# Patient Record
Sex: FEMALE | Race: WHITE | NOT HISPANIC OR LATINO
[De-identification: names, ages, dates, MRNs, and addresses within clinical notes are randomized per-mention and may not be internally consistent; named-entity substitution may affect disease eponyms.]

---

## 2017-12-08 ENCOUNTER — RESULT REVIEW (OUTPATIENT)
Age: 72
End: 2017-12-08

## 2017-12-11 PROBLEM — Z00.00 ENCOUNTER FOR PREVENTIVE HEALTH EXAMINATION: Status: ACTIVE | Noted: 2017-12-11

## 2018-01-05 ENCOUNTER — APPOINTMENT (OUTPATIENT)
Dept: OTOLARYNGOLOGY | Facility: CLINIC | Age: 73
End: 2018-01-05
Payer: MEDICARE

## 2018-01-05 VITALS
HEIGHT: 66 IN | WEIGHT: 210 LBS | HEART RATE: 88 BPM | DIASTOLIC BLOOD PRESSURE: 84 MMHG | BODY MASS INDEX: 33.75 KG/M2 | SYSTOLIC BLOOD PRESSURE: 145 MMHG

## 2018-01-05 DIAGNOSIS — F17.200 NICOTINE DEPENDENCE, UNSPECIFIED, UNCOMPLICATED: ICD-10-CM

## 2018-01-05 DIAGNOSIS — Z82.49 FAMILY HISTORY OF ISCHEMIC HEART DISEASE AND OTHER DISEASES OF THE CIRCULATORY SYSTEM: ICD-10-CM

## 2018-01-05 DIAGNOSIS — Z80.1 FAMILY HISTORY OF MALIGNANT NEOPLASM OF TRACHEA, BRONCHUS AND LUNG: ICD-10-CM

## 2018-01-05 DIAGNOSIS — Z78.9 OTHER SPECIFIED HEALTH STATUS: ICD-10-CM

## 2018-01-05 DIAGNOSIS — Z83.3 FAMILY HISTORY OF DIABETES MELLITUS: ICD-10-CM

## 2018-01-05 PROCEDURE — 99204 OFFICE O/P NEW MOD 45 MIN: CPT

## 2018-01-05 RX ORDER — RANITIDINE HYDROCHLORIDE 150 MG/1
150 TABLET, FILM COATED ORAL
Refills: 0 | Status: ACTIVE | COMMUNITY

## 2018-01-05 RX ORDER — FLUTICASONE PROPIONATE 50 UG/1
50 SPRAY, METERED NASAL
Refills: 0 | Status: ACTIVE | COMMUNITY

## 2018-01-05 RX ORDER — UBIDECARENONE/VIT E ACET 100MG-5
CAPSULE ORAL
Refills: 0 | Status: ACTIVE | COMMUNITY

## 2018-01-05 RX ORDER — TIOTROPIUM BROMIDE 18 UG/1
CAPSULE ORAL; RESPIRATORY (INHALATION)
Refills: 0 | Status: ACTIVE | COMMUNITY

## 2018-01-05 RX ORDER — CRANBERRY FRUIT EXTRACT 650 MG
CAPSULE ORAL
Refills: 0 | Status: ACTIVE | COMMUNITY

## 2018-01-05 RX ORDER — ASPIRIN 81 MG
81 TABLET, DELAYED RELEASE (ENTERIC COATED) ORAL
Refills: 0 | Status: ACTIVE | COMMUNITY

## 2018-02-26 ENCOUNTER — OUTPATIENT (OUTPATIENT)
Dept: OUTPATIENT SERVICES | Facility: HOSPITAL | Age: 73
LOS: 1 days | End: 2018-02-26
Payer: MEDICARE

## 2018-02-26 VITALS
WEIGHT: 207.9 LBS | RESPIRATION RATE: 16 BRPM | HEIGHT: 66 IN | SYSTOLIC BLOOD PRESSURE: 140 MMHG | DIASTOLIC BLOOD PRESSURE: 78 MMHG | OXYGEN SATURATION: 96 % | HEART RATE: 73 BPM | TEMPERATURE: 98 F

## 2018-02-26 DIAGNOSIS — Z98.890 OTHER SPECIFIED POSTPROCEDURAL STATES: Chronic | ICD-10-CM

## 2018-02-26 DIAGNOSIS — K11.9 DISEASE OF SALIVARY GLAND, UNSPECIFIED: ICD-10-CM

## 2018-02-26 DIAGNOSIS — J00 ACUTE NASOPHARYNGITIS [COMMON COLD]: ICD-10-CM

## 2018-02-26 DIAGNOSIS — J44.9 CHRONIC OBSTRUCTIVE PULMONARY DISEASE, UNSPECIFIED: ICD-10-CM

## 2018-02-26 DIAGNOSIS — H26.9 UNSPECIFIED CATARACT: Chronic | ICD-10-CM

## 2018-02-26 DIAGNOSIS — I10 ESSENTIAL (PRIMARY) HYPERTENSION: ICD-10-CM

## 2018-02-26 LAB
BUN SERPL-MCNC: 10 MG/DL — SIGNIFICANT CHANGE UP (ref 7–23)
CALCIUM SERPL-MCNC: 9.3 MG/DL — SIGNIFICANT CHANGE UP (ref 8.4–10.5)
CHLORIDE SERPL-SCNC: 93 MMOL/L — LOW (ref 98–107)
CO2 SERPL-SCNC: 29 MMOL/L — SIGNIFICANT CHANGE UP (ref 22–31)
CREAT SERPL-MCNC: 0.95 MG/DL — SIGNIFICANT CHANGE UP (ref 0.5–1.3)
GLUCOSE SERPL-MCNC: 84 MG/DL — SIGNIFICANT CHANGE UP (ref 70–99)
HCT VFR BLD CALC: 42.1 % — SIGNIFICANT CHANGE UP (ref 34.5–45)
HGB BLD-MCNC: 14.5 G/DL — SIGNIFICANT CHANGE UP (ref 11.5–15.5)
MCHC RBC-ENTMCNC: 31.5 PG — SIGNIFICANT CHANGE UP (ref 27–34)
MCHC RBC-ENTMCNC: 34.4 % — SIGNIFICANT CHANGE UP (ref 32–36)
MCV RBC AUTO: 91.3 FL — SIGNIFICANT CHANGE UP (ref 80–100)
NRBC # FLD: 0 — SIGNIFICANT CHANGE UP
PLATELET # BLD AUTO: 262 K/UL — SIGNIFICANT CHANGE UP (ref 150–400)
PMV BLD: 9.4 FL — SIGNIFICANT CHANGE UP (ref 7–13)
POTASSIUM SERPL-MCNC: 3.6 MMOL/L — SIGNIFICANT CHANGE UP (ref 3.5–5.3)
POTASSIUM SERPL-SCNC: 3.6 MMOL/L — SIGNIFICANT CHANGE UP (ref 3.5–5.3)
RBC # BLD: 4.61 M/UL — SIGNIFICANT CHANGE UP (ref 3.8–5.2)
RBC # FLD: 14.3 % — SIGNIFICANT CHANGE UP (ref 10.3–14.5)
SODIUM SERPL-SCNC: 136 MMOL/L — SIGNIFICANT CHANGE UP (ref 135–145)
WBC # BLD: 6.86 K/UL — SIGNIFICANT CHANGE UP (ref 3.8–10.5)
WBC # FLD AUTO: 6.86 K/UL — SIGNIFICANT CHANGE UP (ref 3.8–10.5)

## 2018-02-26 PROCEDURE — 93010 ELECTROCARDIOGRAM REPORT: CPT

## 2018-02-26 RX ORDER — SODIUM CHLORIDE 9 MG/ML
1000 INJECTION, SOLUTION INTRAVENOUS
Qty: 0 | Refills: 0 | Status: DISCONTINUED | OUTPATIENT
Start: 2018-03-12 | End: 2018-03-27

## 2018-02-26 NOTE — H&P PST ADULT - NSANTHOSAYNRD_GEN_A_CORE
No. KIMBERLY screening performed.  STOP BANG Legend: 0-2 = LOW Risk; 3-4 = INTERMEDIATE Risk; 5-8 = HIGH Risk

## 2018-02-26 NOTE — H&P PST ADULT - NEGATIVE BREAST SYMPTOMS
no breast lump R/no breast lump L/no nipple discharge L/no nipple discharge R/no breast tenderness L/no breast tenderness R

## 2018-02-26 NOTE — H&P PST ADULT - FAMILY HISTORY
Aunt  Still living? No  Family history of lung cancer, Age at diagnosis: Age Unknown  Family history of breast cancer, Age at diagnosis: Age Unknown  Family history of diabetes mellitus, Age at diagnosis: Age Unknown     Mother  Still living? No  Family history of congestive heart failure, Age at diagnosis: Age Unknown     Father  Still living? No  Family history of cirrhosis of liver, Age at diagnosis: Age Unknown

## 2018-02-26 NOTE — H&P PST ADULT - PROBLEM SELECTOR PLAN 4
Pt. c/o cough today, pt scheduled for medical clearance on 3/1/18.  Pt. instructed to contact PMD for sooner appointment and to notify surgeon if symptoms get worse before Thursday

## 2018-02-26 NOTE — H&P PST ADULT - PMH
Cold    COPD (chronic obstructive pulmonary disease)    Glaucoma    Hypercholesteremia    Hypertension    Hypothyroidism    Parotid mass    Uterine cancer  2013 s/p total   hysterectomy

## 2018-02-26 NOTE — H&P PST ADULT - PSH
H/O dilation and curettage  2013  H/O total hysterectomy  2013 Cataract, left  2015  H/O dilation and curettage  2013, 1991  H/O total hysterectomy  2013  History of tonsillectomy and adenoidectomy  1951

## 2018-02-26 NOTE — H&P PST ADULT - HISTORY OF PRESENT ILLNESS
73 y/o female about a year ago she noticed the parotid swelling.  Over the past 4-5 months it increased in size.  MRI was done.  She denies any pain, tenderness or discomfort.  She is scheduled for left parotidectomy on 3/8/18. 71 y/o female about a year ago she noticed the parotid swelling.  Over the past 4-5 months it increased in size.  MRI was done.  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  She denies any pain, tenderness or discomfort.  She is scheduled for left parotidectomy on 3/8/18.

## 2018-02-26 NOTE — H&P PST ADULT - PROBLEM SELECTOR PLAN 1
Pt. is scheduled for left parotidectomy on 3/8/18.  Preoperative instructions reviewed, pt verbalized understanding.  Preop Lab results pending, EKG done.  Pt. instructed to obtain medical evaluation prior to surgery. Please obtain results of recent Stress test and Echocardiogram from Dr. Weber for PST chart

## 2018-03-12 ENCOUNTER — APPOINTMENT (OUTPATIENT)
Dept: OTOLARYNGOLOGY | Facility: HOSPITAL | Age: 73
End: 2018-03-12

## 2018-03-12 ENCOUNTER — OUTPATIENT (OUTPATIENT)
Dept: OUTPATIENT SERVICES | Facility: HOSPITAL | Age: 73
LOS: 1 days | Discharge: HOME | End: 2018-03-12
Payer: MEDICARE

## 2018-03-12 ENCOUNTER — RESULT REVIEW (OUTPATIENT)
Age: 73
End: 2018-03-12

## 2018-03-12 VITALS
HEART RATE: 78 BPM | RESPIRATION RATE: 14 BRPM | SYSTOLIC BLOOD PRESSURE: 118 MMHG | DIASTOLIC BLOOD PRESSURE: 74 MMHG | OXYGEN SATURATION: 99 %

## 2018-03-12 VITALS
HEIGHT: 66 IN | DIASTOLIC BLOOD PRESSURE: 74 MMHG | RESPIRATION RATE: 16 BRPM | OXYGEN SATURATION: 97 % | HEART RATE: 75 BPM | SYSTOLIC BLOOD PRESSURE: 135 MMHG | WEIGHT: 207.9 LBS | TEMPERATURE: 98 F

## 2018-03-12 DIAGNOSIS — Z98.890 OTHER SPECIFIED POSTPROCEDURAL STATES: Chronic | ICD-10-CM

## 2018-03-12 DIAGNOSIS — K11.9 DISEASE OF SALIVARY GLAND, UNSPECIFIED: ICD-10-CM

## 2018-03-12 DIAGNOSIS — H26.9 UNSPECIFIED CATARACT: Chronic | ICD-10-CM

## 2018-03-12 PROCEDURE — 42415 EXCISE PAROTID GLAND/LESION: CPT | Mod: AS,LT

## 2018-03-12 PROCEDURE — 42415 EXCISE PAROTID GLAND/LESION: CPT | Mod: LT

## 2018-03-12 PROCEDURE — 88307 TISSUE EXAM BY PATHOLOGIST: CPT | Mod: 26

## 2018-03-12 PROCEDURE — 88331 PATH CONSLTJ SURG 1 BLK 1SPC: CPT | Mod: 26

## 2018-03-12 RX ORDER — TIOTROPIUM BROMIDE 18 UG/1
2 CAPSULE ORAL; RESPIRATORY (INHALATION)
Qty: 0 | Refills: 0 | COMMUNITY

## 2018-03-12 RX ORDER — EZETIMIBE AND SIMVASTATIN 10; 80 MG/1; MG/1
1 TABLET, FILM COATED ORAL
Qty: 0 | Refills: 0 | COMMUNITY

## 2018-03-12 RX ORDER — FENTANYL CITRATE 50 UG/ML
50 INJECTION INTRAVENOUS
Qty: 0 | Refills: 0 | Status: DISCONTINUED | OUTPATIENT
Start: 2018-03-12 | End: 2018-03-12

## 2018-03-12 RX ORDER — OXYCODONE AND ACETAMINOPHEN 5; 325 MG/1; MG/1
5-325 TABLET ORAL
Qty: 20 | Refills: 0 | Status: ACTIVE | COMMUNITY
Start: 2018-03-12 | End: 1900-01-01

## 2018-03-12 RX ORDER — OXYCODONE HYDROCHLORIDE 5 MG/1
5 TABLET ORAL EVERY 4 HOURS
Qty: 0 | Refills: 0 | Status: DISCONTINUED | OUTPATIENT
Start: 2018-03-12 | End: 2018-03-12

## 2018-03-12 RX ORDER — TIMOLOL 0.5 %
1 DROPS OPHTHALMIC (EYE)
Qty: 0 | Refills: 0 | COMMUNITY

## 2018-03-12 RX ORDER — ONDANSETRON 8 MG/1
4 TABLET, FILM COATED ORAL EVERY 6 HOURS
Qty: 0 | Refills: 0 | Status: DISCONTINUED | OUTPATIENT
Start: 2018-03-12 | End: 2018-03-12

## 2018-03-12 RX ORDER — PREGABALIN 225 MG/1
1 CAPSULE ORAL
Qty: 0 | Refills: 0 | COMMUNITY

## 2018-03-12 RX ORDER — SODIUM CHLORIDE 9 MG/ML
1000 INJECTION, SOLUTION INTRAVENOUS
Qty: 0 | Refills: 0 | Status: DISCONTINUED | OUTPATIENT
Start: 2018-03-12 | End: 2018-03-27

## 2018-03-12 RX ORDER — FLUTICASONE PROPIONATE 50 MCG
2 SPRAY, SUSPENSION NASAL
Qty: 0 | Refills: 0 | COMMUNITY

## 2018-03-12 RX ORDER — ASPIRIN/CALCIUM CARB/MAGNESIUM 324 MG
1 TABLET ORAL
Qty: 0 | Refills: 0 | COMMUNITY

## 2018-03-12 RX ORDER — RANITIDINE HYDROCHLORIDE 150 MG/1
1 TABLET, FILM COATED ORAL
Qty: 0 | Refills: 0 | COMMUNITY

## 2018-03-12 RX ORDER — VERAPAMIL HCL 240 MG
1 CAPSULE, EXTENDED RELEASE PELLETS 24 HR ORAL
Qty: 0 | Refills: 0 | COMMUNITY

## 2018-03-12 RX ADMIN — SODIUM CHLORIDE 50 MILLILITER(S): 9 INJECTION, SOLUTION INTRAVENOUS at 11:00

## 2018-03-12 NOTE — ASU DISCHARGE PLAN (ADULT/PEDIATRIC). - MEDICATION SUMMARY - MEDICATIONS TO TAKE
I will START or STAY ON the medications listed below when I get home from the hospital:    l-thyroxine  -- 75 milligram(s) by mouth once a day in morning  -- Indication: For hypothyroid    calcium  -- 600 milligram(s) by mouth 2 times a day  -- Indication: For supplement    vitamin D  -- 400 milligram(s) by mouth once a day in morning  -- Indication: For supplement    Aspirin Low Dose 81 mg oral delayed release tablet  -- 1 tab(s) by mouth once a day in morning - last dose 3/1/2018  -- Indication: For supplement    oxyCODONE-acetaminophen 5 mg-325 mg oral tablet  -- 1 tab(s) by mouth every 6 hours, As Needed -for moderate pain MDD:8 tabs   -- Caution federal law prohibits the transfer of this drug to any person other  than the person for whom it was prescribed.  May cause drowsiness.  Alcohol may intensify this effect.  Use care when operating dangerous machinery.  This prescription cannot be refilled.  This product contains acetaminophen.  Do not use  with any other product containing acetaminophen to prevent possible liver damage.  Using more of this medication than prescribed may cause serious breathing problems.    -- Indication: For Moderate pain    verapamil 240 mg/24 hours oral capsule, extended release  -- 1 cap(s) by mouth once a day in morning  -- Indication: For hypertension    Vytorin 10 mg-40 mg oral tablet  -- 1 tab(s) by mouth once a day in morning  -- Indication: For hyperlipidemia    Spiriva Respimat 2.5 mcg/inh inhalation aerosol  -- 2 puff(s) inhaled once a day in morning  -- Indication: For bronchospasms    hydroCHLOROthiazide 25 mg oral tablet  -- 1 tab(s) by mouth once a day in morning  -- Indication: For hypertension    raNITIdine 150 mg oral capsule  -- 1 cap(s) by mouth once a day in morning  -- Indication: For GERD    TheraCran HP oral capsule  -- 240 milligram(s) by mouth once a day in morning. Instructed to stop on 3/1/18.  -- Indication: For supplement    fluticasone 50 mcg/inh nasal spray  -- 2 spray(s) into nose once a day in morning  -- Indication: For congestion    timolol hemihydrate 0.5% ophthalmic solution  -- 1 drop(s) to each affected eye once a day- in morning  -- Indication: For eye drop    Vitamin B12 1000 mcg oral tablet  -- 1 tab(s) by mouth once a day in morning  -- Indication: For supplement I will START or STAY ON the medications listed below when I get home from the hospital:    l-thyroxine  -- 75 milligram(s) by mouth once a day in morning  -- Indication: For hypothyroid    calcium  -- 600 milligram(s) by mouth 2 times a day  -- Indication: For supplement    vitamin D  -- 400 milligram(s) by mouth once a day in morning  -- Indication: For supplement    Aspirin Low Dose 81 mg oral delayed release tablet  -- 1 tab(s) by mouth once a day in morning - last dose 3/1/2018  -- Indication: For supplement    oxyCODONE-acetaminophen 5 mg-325 mg oral tablet  -- 1 tab(s) by mouth every 6 hours, As Needed  -for moderate pain MDD:8 tabs   -- Caution federal law prohibits the transfer of this drug to any person other  than the person for whom it was prescribed.  May cause drowsiness.  Alcohol may intensify this effect.  Use care when operating dangerous machinery.  This prescription cannot be refilled.  This product contains acetaminophen.  Do not use  with any other product containing acetaminophen to prevent possible liver damage.  Using more of this medication than prescribed may cause serious breathing problems.    -- Indication: For pain    verapamil 240 mg/24 hours oral capsule, extended release  -- 1 cap(s) by mouth once a day in morning  -- Indication: For hypertension    Vytorin 10 mg-40 mg oral tablet  -- 1 tab(s) by mouth once a day in morning  -- Indication: For hyperlipidemia    Spiriva Respimat 2.5 mcg/inh inhalation aerosol  -- 2 puff(s) inhaled once a day in morning  -- Indication: For bronchospasms    hydroCHLOROthiazide 25 mg oral tablet  -- 1 tab(s) by mouth once a day in morning  -- Indication: For hypertension    raNITIdine 150 mg oral capsule  -- 1 cap(s) by mouth once a day in morning  -- Indication: For GERD    TheraCran HP oral capsule  -- 240 milligram(s) by mouth once a day in morning. Instructed to stop on 3/1/18.  -- Indication: For supplement    fluticasone 50 mcg/inh nasal spray  -- 2 spray(s) into nose once a day in morning  -- Indication: For congestion    timolol hemihydrate 0.5% ophthalmic solution  -- 1 drop(s) to each affected eye once a day- in morning  -- Indication: For eye drop    Vitamin B12 1000 mcg oral tablet  -- 1 tab(s) by mouth once a day in morning  -- Indication: For supplement

## 2018-03-12 NOTE — ASU PREOP CHECKLIST - 2.
escort:son &  in  c#[phone] use this as po# also pt. granted permission to leave message /and or speak with whoever answers the phone.

## 2018-03-12 NOTE — ASU DISCHARGE PLAN (ADULT/PEDIATRIC). - NOTIFY
Swelling that continues/Pain not relieved by Medications Swelling that continues/Persistent Nausea and Vomiting/Fever greater than 101/Bleeding that does not stop/Numbness, color, or temperature change to extremity/Pain not relieved by Medications/Unable to Urinate/Inability to Tolerate Liquids or Foods

## 2018-03-12 NOTE — ASU DISCHARGE PLAN (ADULT/PEDIATRIC). - NURSING INSTRUCTIONS
Do not take pain medication on an empty stomach.  Increase fluids and fiber in diet to prevent constipation. Do not take pain medication on an empty stomach.  Increase fluids and fiber in diet to prevent constipation. CAMILLA drain teaching and written instructions given. Patient with good understanding

## 2018-03-12 NOTE — ASU DISCHARGE PLAN (ADULT/PEDIATRIC). - POST OP PHONE #
anna london#[phone]  pt. granted permission to leave message /and or speak with whoever answers the phone.

## 2018-03-13 ENCOUNTER — TRANSCRIPTION ENCOUNTER (OUTPATIENT)
Age: 73
End: 2018-03-13

## 2018-03-14 ENCOUNTER — APPOINTMENT (OUTPATIENT)
Dept: OTOLARYNGOLOGY | Facility: CLINIC | Age: 73
End: 2018-03-14
Payer: MEDICARE

## 2018-03-14 LAB — SURGICAL PATHOLOGY STUDY: SIGNIFICANT CHANGE UP

## 2018-03-14 PROCEDURE — 99024 POSTOP FOLLOW-UP VISIT: CPT

## 2018-03-21 ENCOUNTER — APPOINTMENT (OUTPATIENT)
Dept: OTOLARYNGOLOGY | Facility: CLINIC | Age: 73
End: 2018-03-21

## 2018-03-21 ENCOUNTER — APPOINTMENT (OUTPATIENT)
Dept: OTOLARYNGOLOGY | Facility: CLINIC | Age: 73
End: 2018-03-21
Payer: MEDICARE

## 2018-03-21 PROCEDURE — 99024 POSTOP FOLLOW-UP VISIT: CPT

## 2018-05-15 ENCOUNTER — APPOINTMENT (OUTPATIENT)
Dept: OTOLARYNGOLOGY | Facility: CLINIC | Age: 73
End: 2018-05-15
Payer: MEDICARE

## 2018-05-15 VITALS
SYSTOLIC BLOOD PRESSURE: 96 MMHG | BODY MASS INDEX: 33.75 KG/M2 | RESPIRATION RATE: 16 BRPM | HEIGHT: 66 IN | DIASTOLIC BLOOD PRESSURE: 68 MMHG | WEIGHT: 210 LBS | HEART RATE: 84 BPM

## 2018-05-15 DIAGNOSIS — D11.0 BENIGN NEOPLASM OF PAROTID GLAND: ICD-10-CM

## 2018-05-15 PROCEDURE — 99024 POSTOP FOLLOW-UP VISIT: CPT

## 2019-09-27 NOTE — H&P PST ADULT - NS SC CAGE ALCOHOL ANNOYED YOU
The urinary drainage system is closed at the end of the procedure/Proper hand hygiene was performed/A sterile drape was used to cover all adjacent areas/The catheter was appropriately lubricated
no

## 2021-10-12 NOTE — H&P PST ADULT - BMI (KG/M2)
Patient Seen in: [facility] Emergency Department      History   Patient presents with:  Nausea/Vomiting/Diarrhea  Abdomen/Flank Pain    Stated Complaint: nausea/abd pain    Subjective:   HPI    59-year-old female presents emergency department with h infection transmission during my interaction with the patient were taken. The patient was already wearing a droplet mask on my arrival to the room.  Personal protective equipment including droplet mask, eye protection, and gloves were worn throughout the du With Platelet.   Procedure                               Abnormality         Status                     ---------                               -----------         ------                     CBC W/ DIFFERENTIAL[[id number]]          Abnormal            Final cornua of the right uterus could be an Essure contraceptive device. Cystic structure right adnexa measuring 2.7 cm is probably a functional ovarian cyst. BONES:  No bony lesion or fracture. LUNG BASES:  No visible pulmonary or pleural disease.   OTHER:  N As a result errors may occur. When identified these errors have been corrected.  While every attempt is made to correct errors during dictation discrepancies may still exist                             Disposition and Plan     Clinical Impression:  Acute ga 33.6

## 2022-06-24 ENCOUNTER — EMERGENCY (EMERGENCY)
Facility: HOSPITAL | Age: 77
LOS: 1 days | Discharge: HOME | End: 2022-06-24
Attending: EMERGENCY MEDICINE
Payer: MEDICARE

## 2022-06-24 VITALS
RESPIRATION RATE: 17 BRPM | TEMPERATURE: 98 F | SYSTOLIC BLOOD PRESSURE: 168 MMHG | OXYGEN SATURATION: 95 % | DIASTOLIC BLOOD PRESSURE: 86 MMHG | HEART RATE: 74 BPM

## 2022-06-24 VITALS
WEIGHT: 214.95 LBS | HEIGHT: 66 IN | SYSTOLIC BLOOD PRESSURE: 141 MMHG | DIASTOLIC BLOOD PRESSURE: 83 MMHG | HEART RATE: 82 BPM | TEMPERATURE: 98 F | RESPIRATION RATE: 20 BRPM

## 2022-06-24 DIAGNOSIS — Z98.890 OTHER SPECIFIED POSTPROCEDURAL STATES: Chronic | ICD-10-CM

## 2022-06-24 DIAGNOSIS — H26.9 UNSPECIFIED CATARACT: Chronic | ICD-10-CM

## 2022-06-24 LAB
ALBUMIN SERPL ELPH-MCNC: 3.9 G/DL — SIGNIFICANT CHANGE UP (ref 3.3–5)
ALP SERPL-CCNC: 71 U/L — SIGNIFICANT CHANGE UP (ref 40–120)
ALT FLD-CCNC: 19 U/L — SIGNIFICANT CHANGE UP (ref 10–45)
ANION GAP SERPL CALC-SCNC: 9 MMOL/L — SIGNIFICANT CHANGE UP (ref 5–17)
AST SERPL-CCNC: 22 U/L — SIGNIFICANT CHANGE UP (ref 10–40)
BASOPHILS # BLD AUTO: 0.03 K/UL — SIGNIFICANT CHANGE UP (ref 0–0.2)
BASOPHILS NFR BLD AUTO: 0.4 % — SIGNIFICANT CHANGE UP (ref 0–2)
BILIRUB SERPL-MCNC: 0.4 MG/DL — SIGNIFICANT CHANGE UP (ref 0.2–1.2)
BUN SERPL-MCNC: 13 MG/DL — SIGNIFICANT CHANGE UP (ref 7–23)
CALCIUM SERPL-MCNC: 8.7 MG/DL — SIGNIFICANT CHANGE UP (ref 8.4–10.5)
CHLORIDE SERPL-SCNC: 102 MMOL/L — SIGNIFICANT CHANGE UP (ref 96–108)
CO2 SERPL-SCNC: 29 MMOL/L — SIGNIFICANT CHANGE UP (ref 22–31)
CREAT SERPL-MCNC: 0.91 MG/DL — SIGNIFICANT CHANGE UP (ref 0.5–1.3)
D DIMER BLD IA.RAPID-MCNC: 252 NG/ML DDU — HIGH
EGFR: 65 ML/MIN/1.73M2 — SIGNIFICANT CHANGE UP
EOSINOPHIL # BLD AUTO: 0.22 K/UL — SIGNIFICANT CHANGE UP (ref 0–0.5)
EOSINOPHIL NFR BLD AUTO: 3.1 % — SIGNIFICANT CHANGE UP (ref 0–6)
GLUCOSE SERPL-MCNC: 95 MG/DL — SIGNIFICANT CHANGE UP (ref 70–99)
HCT VFR BLD CALC: 41.1 % — SIGNIFICANT CHANGE UP (ref 34.5–45)
HGB BLD-MCNC: 13.6 G/DL — SIGNIFICANT CHANGE UP (ref 11.5–15.5)
IMM GRANULOCYTES NFR BLD AUTO: 0.4 % — SIGNIFICANT CHANGE UP (ref 0–1.5)
LYMPHOCYTES # BLD AUTO: 1.62 K/UL — SIGNIFICANT CHANGE UP (ref 1–3.3)
LYMPHOCYTES # BLD AUTO: 22.8 % — SIGNIFICANT CHANGE UP (ref 13–44)
MCHC RBC-ENTMCNC: 30.8 PG — SIGNIFICANT CHANGE UP (ref 27–34)
MCHC RBC-ENTMCNC: 33.1 GM/DL — SIGNIFICANT CHANGE UP (ref 32–36)
MCV RBC AUTO: 93.2 FL — SIGNIFICANT CHANGE UP (ref 80–100)
MONOCYTES # BLD AUTO: 0.53 K/UL — SIGNIFICANT CHANGE UP (ref 0–0.9)
MONOCYTES NFR BLD AUTO: 7.5 % — SIGNIFICANT CHANGE UP (ref 2–14)
NEUTROPHILS # BLD AUTO: 4.68 K/UL — SIGNIFICANT CHANGE UP (ref 1.8–7.4)
NEUTROPHILS NFR BLD AUTO: 65.8 % — SIGNIFICANT CHANGE UP (ref 43–77)
NRBC # BLD: 0 /100 WBCS — SIGNIFICANT CHANGE UP (ref 0–0)
NT-PROBNP SERPL-SCNC: 83 PG/ML — SIGNIFICANT CHANGE UP (ref 0–300)
PLATELET # BLD AUTO: 229 K/UL — SIGNIFICANT CHANGE UP (ref 150–400)
POTASSIUM SERPL-MCNC: 3.9 MMOL/L — SIGNIFICANT CHANGE UP (ref 3.5–5.3)
POTASSIUM SERPL-SCNC: 3.9 MMOL/L — SIGNIFICANT CHANGE UP (ref 3.5–5.3)
PROT SERPL-MCNC: 6.4 G/DL — SIGNIFICANT CHANGE UP (ref 6–8.3)
RBC # BLD: 4.41 M/UL — SIGNIFICANT CHANGE UP (ref 3.8–5.2)
RBC # FLD: 13.7 % — SIGNIFICANT CHANGE UP (ref 10.3–14.5)
SARS-COV-2 RNA SPEC QL NAA+PROBE: SIGNIFICANT CHANGE UP
SODIUM SERPL-SCNC: 140 MMOL/L — SIGNIFICANT CHANGE UP (ref 135–145)
TROPONIN T, HIGH SENSITIVITY RESULT: 10 NG/L — SIGNIFICANT CHANGE UP (ref 0–51)
WBC # BLD: 7.11 K/UL — SIGNIFICANT CHANGE UP (ref 3.8–10.5)
WBC # FLD AUTO: 7.11 K/UL — SIGNIFICANT CHANGE UP (ref 3.8–10.5)

## 2022-06-24 PROCEDURE — 99284 EMERGENCY DEPT VISIT MOD MDM: CPT | Mod: CS,GC

## 2022-06-24 PROCEDURE — 83880 ASSAY OF NATRIURETIC PEPTIDE: CPT

## 2022-06-24 PROCEDURE — 71046 X-RAY EXAM CHEST 2 VIEWS: CPT

## 2022-06-24 PROCEDURE — 85025 COMPLETE CBC W/AUTO DIFF WBC: CPT

## 2022-06-24 PROCEDURE — 71046 X-RAY EXAM CHEST 2 VIEWS: CPT | Mod: 26

## 2022-06-24 PROCEDURE — U0005: CPT

## 2022-06-24 PROCEDURE — 85379 FIBRIN DEGRADATION QUANT: CPT

## 2022-06-24 PROCEDURE — 93005 ELECTROCARDIOGRAM TRACING: CPT

## 2022-06-24 PROCEDURE — 36415 COLL VENOUS BLD VENIPUNCTURE: CPT

## 2022-06-24 PROCEDURE — 84484 ASSAY OF TROPONIN QUANT: CPT

## 2022-06-24 PROCEDURE — U0003: CPT

## 2022-06-24 PROCEDURE — 99285 EMERGENCY DEPT VISIT HI MDM: CPT | Mod: 25

## 2022-06-24 PROCEDURE — 80053 COMPREHEN METABOLIC PANEL: CPT

## 2022-06-24 NOTE — ED ADULT NURSE NOTE - NSICDXPASTMEDICALHX_GEN_ALL_CORE_FT
What Type Of Note Output Would You Prefer (Optional)?: Standard Output Hpi Title: Evaluation of a Skin Lesion How Severe Are Your Spot(S)?: moderate Have Your Spot(S) Been Treated In The Past?: has not been treated PAST MEDICAL HISTORY:  HLD (hyperlipidemia)     HTN (hypertension)

## 2022-06-24 NOTE — ED PROVIDER NOTE - CARE PROVIDERS DIRECT ADDRESSES
[email or web address]

## 2022-06-24 NOTE — ED PROVIDER NOTE - PHYSICAL EXAMINATION
Gen: well developed female NAD   HEENT: NCAT, EOMI, no nasal discharge, mucous membranes moist  CV: RRR, +S1/S2, no M/R/G  Resp: CTAB, no W/R/R  GI: Abdomen soft non-distended, NTTP, no masses  MSK: No open wounds, no bruising, no LE edema  Neuro: A&Ox3, following commands, moving all four extremities spontaneously  Psych: appropriate mood Gen: well developed female NAD   HEENT: NCAT, EOMI, no nasal discharge, mucous membranes moist  CV: RRR, +S1/S2, no M/R/G  Resp: CTAB, no W/R/R no inc WOB (RA sat 90-93%- hx of COPD)   GI: Abdomen soft non-distended, NTTP, no masses  MSK: No open wounds, no bruising, no LE edema  Neuro: A&Ox3, following commands, moving all four extremities spontaneously  Psych: appropriate mood

## 2022-06-24 NOTE — ED PROVIDER NOTE - OBJECTIVE STATEMENT
75 y/o F PMH HTN HLD presents to ED c/o chest pain onset last night while resting in bed lasting 8-10 minutes, denies associated n/v, sob, palpitations. Denies hx cardiac stents, recent travel, surgeries, immobilization. pt reports a few weeks ago she had 1 episode where she felt sob walking around her house. denies hx blood clots or PE in the past. pt took 1 full dose aspirin 324mg PTA. 75 y/o F PMH HTN HLD presents to ED c/o chest pain onset early this morning [id number] while resting in bed lasting ~8-10 minutes, denies associated n/v, sob, palpitations. Denies hx cardiac stents, recent travel, surgeries, immobilization. pt reports a few weeks ago she had 1 episode where she felt sob walking around her house. denies hx blood clots or PE in the past. pt took 1 full dose aspirin 324mg PTA.

## 2022-06-24 NOTE — ED PROVIDER NOTE - NSFOLLOWUPINSTRUCTIONS_ED_ALL_ED_FT
Rest and stay well hydrated.  Follow-up with your PMD within 1 week for further evaluation.   Follow-up with Cardiology - consider Dr. Costa - within 1 week for further evaluation of your chest pain.   Return to the ED for any worsening pain, difficulty breathing, vomiting or any new concerning symptoms.       Chest Pain    Chest pain can be caused by many different conditions which may or may not be dangerous. Causes include heartburn, lung infections, heart attack, blood clot in lungs, skin infections, strain or damage to muscle, cartilage, or bones, etc. In addition to a history and physical examination, an electrocardiogram (ECG) or other lab tests may have been performed to determine the cause of your chest pain. Follow up with your primary care provider or with a cardiologist as instructed.     SEEK IMMEDIATE MEDICAL CARE IF YOU HAVE ANY OF THE FOLLOWING SYMPTOMS: worsening chest pain, coughing up blood, unexplained back/neck/jaw pain, severe abdominal pain, dizziness or lightheadedness, fainting, shortness of breath, sweaty or clammy skin, vomiting, or racing heart beat. These symptoms may represent a serious problem that is an emergency. Do not wait to see if the symptoms will go away. Get medical help right away. Call 911 and do not drive yourself to the hospital.

## 2022-06-24 NOTE — ED PROVIDER NOTE - NSFOLLOWUPCLINICS_GEN_ALL_ED_FT
[facility] - Primary Care  Primary Care  [address] [place] [address]  Phone: [phone]  Fax:     [facility]  Cardiology  [address] [place] [id number]  Phone: [phone]  Fax:

## 2022-06-24 NOTE — ED PROVIDER NOTE - PATIENT PORTAL LINK FT
You can access the FollowMyHealth Patient Portal offered by [facility] by registering at the following website: [email or web address]. By joining [facility]’s FollowMyHealth portal, you will also be able to view your health information using other applications (apps) compatible with our system.

## 2022-06-24 NOTE — ED PROVIDER NOTE - CARE PROVIDER_API CALL
Cary Costa)  Cardiovascular Disease; Interventional Cardiology  [address] [place] [address]  Phone: [phone]  Fax: [phone]  Follow Up Time: 4-6 Days

## 2022-06-24 NOTE — ED ADULT NURSE NOTE - OBJECTIVE STATEMENT
75 y/o female PMH HTN, HLD and hypothyroid presenting to ED c/o sudden onset of cp that began at 5 am this morning not associated with any other s/s or complaints, states that while she was lying down trying to fall asleep she started to experience cp which lasted approx 8-10 min. denies any prior cardiac hx or similar s/s in the passed. no sob, numbness, tingling, weakness, diaphoresis, palpitations, nausea, vomiting, fevers or dizziness present. denies any recent or known sick contacts. VS stable. EKG completed upon triage arrival. remains on continuous cardiac monitor. labs obtained, results pending.

## 2022-06-24 NOTE — ED PROVIDER NOTE - PROGRESS NOTE DETAILS
Ford, PGY3 - age adjusted dimer cutoff 380, dimer today negative. pt was reassessed, states feels improved, no further cp since episode this morning at 5am. discussed results with patient, offered CDU but no beds. discussed / offered admission vs close f/u w/ Dr. Cary Costa. pt states she would like to f/u w/ cardiology as outpt within 1 week for admission, return precautions. pt verbalized understanding, agrees with plan, all questions answered.

## 2022-06-27 PROBLEM — I10 ESSENTIAL (PRIMARY) HYPERTENSION: Chronic | Status: ACTIVE | Noted: 2018-02-26

## 2022-06-27 PROBLEM — E78.00 PURE HYPERCHOLESTEROLEMIA, UNSPECIFIED: Chronic | Status: ACTIVE | Noted: 2018-02-26

## 2022-06-27 PROBLEM — H40.9 UNSPECIFIED GLAUCOMA: Chronic | Status: ACTIVE | Noted: 2018-02-26

## 2022-06-27 PROBLEM — E03.9 HYPOTHYROIDISM, UNSPECIFIED: Chronic | Status: ACTIVE | Noted: 2018-02-26

## 2022-06-27 PROBLEM — C55 MALIGNANT NEOPLASM OF UTERUS, PART UNSPECIFIED: Chronic | Status: ACTIVE | Noted: 2018-02-26

## 2022-06-27 PROBLEM — K11.9 DISEASE OF SALIVARY GLAND, UNSPECIFIED: Chronic | Status: ACTIVE | Noted: 2018-02-26

## 2022-06-27 PROBLEM — J44.9 CHRONIC OBSTRUCTIVE PULMONARY DISEASE, UNSPECIFIED: Chronic | Status: ACTIVE | Noted: 2018-02-26

## 2022-06-27 PROBLEM — J00 ACUTE NASOPHARYNGITIS [COMMON COLD]: Chronic | Status: ACTIVE | Noted: 2018-02-26

## 2022-06-28 ENCOUNTER — APPOINTMENT (OUTPATIENT)
Dept: CARDIOLOGY | Facility: CLINIC | Age: 77
End: 2022-06-28

## 2022-06-28 ENCOUNTER — NON-APPOINTMENT (OUTPATIENT)
Age: 77
End: 2022-06-28

## 2022-06-28 VITALS
WEIGHT: 207 LBS | OXYGEN SATURATION: 94 % | SYSTOLIC BLOOD PRESSURE: 129 MMHG | BODY MASS INDEX: 33.27 KG/M2 | HEART RATE: 76 BPM | HEIGHT: 66 IN | DIASTOLIC BLOOD PRESSURE: 82 MMHG

## 2022-06-28 DIAGNOSIS — E78.00 PURE HYPERCHOLESTEROLEMIA, UNSPECIFIED: ICD-10-CM

## 2022-06-28 DIAGNOSIS — I10 ESSENTIAL (PRIMARY) HYPERTENSION: ICD-10-CM

## 2022-06-28 DIAGNOSIS — E03.9 HYPOTHYROIDISM, UNSPECIFIED: ICD-10-CM

## 2022-06-28 DIAGNOSIS — J44.9 CHRONIC OBSTRUCTIVE PULMONARY DISEASE, UNSPECIFIED: ICD-10-CM

## 2022-06-28 DIAGNOSIS — R07.9 CHEST PAIN, UNSPECIFIED: ICD-10-CM

## 2022-06-28 PROCEDURE — 93000 ELECTROCARDIOGRAM COMPLETE: CPT

## 2022-06-28 PROCEDURE — 99204 OFFICE O/P NEW MOD 45 MIN: CPT

## 2022-06-28 RX ORDER — LEVOTHYROXINE SODIUM 0.07 MG/1
75 TABLET ORAL DAILY
Qty: 90 | Refills: 2 | Status: ACTIVE | COMMUNITY

## 2022-06-28 RX ORDER — VERAPAMIL HYDROCHLORIDE 240 MG/1
240 CAPSULE, DELAYED RELEASE PELLETS ORAL
Qty: 10 | Refills: 1 | Status: ACTIVE | COMMUNITY

## 2022-06-28 RX ORDER — FLUTICASONE PROPIONATE AND SALMETEROL 50; 250 UG/1; UG/1
250-50 POWDER RESPIRATORY (INHALATION)
Qty: 180 | Refills: 0 | Status: ACTIVE | COMMUNITY
Start: 2021-10-13

## 2022-06-28 RX ORDER — EZETIMIBE AND SIMVASTATIN 10; 40 MG/1; MG/1
10-40 TABLET ORAL
Refills: 3 | Status: ACTIVE | COMMUNITY

## 2022-06-28 RX ORDER — ALPRAZOLAM 0.25 MG/1
0.25 TABLET ORAL
Qty: 30 | Refills: 0 | Status: ACTIVE | COMMUNITY
Start: 2022-03-10

## 2022-06-28 RX ORDER — TIMOLOL MALEATE 5 MG/ML
0.5 SOLUTION OPHTHALMIC
Qty: 10 | Refills: 0 | Status: ACTIVE | COMMUNITY
Start: 2021-06-21

## 2022-06-28 RX ORDER — HYDROCHLOROTHIAZIDE 25 MG/1
25 TABLET ORAL DAILY
Qty: 30 | Refills: 0 | Status: ACTIVE | COMMUNITY

## 2022-06-29 NOTE — HISTORY OF PRESENT ILLNESS
[FreeTextEntry1] : Vee is a 76-year-old female HTN, hypothyroid, HLD, COPD still smoking s/p ER visit with chest pain last Friday lasting several minutes and squeezing feeling around her abdomen. It lasted about 10 minutes. No further episodes.

## 2022-06-29 NOTE — DISCUSSION/SUMMARY
[FreeTextEntry1] : The patient is a 76-year-old female smoker, COPD, HTN, HLD with chest pain. \par #1 CV- ECG normal, echo and nuclear stress test\par #2 Htn- c/w verapamil and trial HCTZ, lots of side effects to medications\par #3 Lipids- c/w vytorin 10/40\par #4 COPD- stable on inhalers\par #5 Hypothyroid- c/w levothyroxine\par #6 GI- if above negative, pursue further  [EKG obtained to assist in diagnosis and management of assessed problem(s)] : EKG obtained to assist in diagnosis and management of assessed problem(s)

## 2022-08-03 ENCOUNTER — APPOINTMENT (OUTPATIENT)
Dept: CARDIOLOGY | Facility: CLINIC | Age: 77
End: 2022-08-03

## 2022-08-24 ENCOUNTER — APPOINTMENT (OUTPATIENT)
Dept: CARDIOLOGY | Facility: CLINIC | Age: 77
End: 2022-08-24

## 2023-06-26 PROBLEM — E78.5 HYPERLIPIDEMIA, UNSPECIFIED: Chronic | Status: ACTIVE | Noted: 2022-06-24

## 2023-06-26 PROBLEM — I10 ESSENTIAL (PRIMARY) HYPERTENSION: Chronic | Status: ACTIVE | Noted: 2022-06-24

## 2023-09-19 ENCOUNTER — OUTPATIENT (OUTPATIENT)
Dept: OUTPATIENT SERVICES | Facility: HOSPITAL | Age: 78
LOS: 1 days | End: 2023-09-19
Payer: MEDICARE

## 2023-09-19 ENCOUNTER — APPOINTMENT (OUTPATIENT)
Dept: CV DIAGNOSTICS | Facility: HOSPITAL | Age: 78
End: 2023-09-19

## 2023-09-19 DIAGNOSIS — R07.9 CHEST PAIN, UNSPECIFIED: ICD-10-CM

## 2023-09-19 DIAGNOSIS — Z98.890 OTHER SPECIFIED POSTPROCEDURAL STATES: Chronic | ICD-10-CM

## 2023-09-19 DIAGNOSIS — H26.9 UNSPECIFIED CATARACT: Chronic | ICD-10-CM

## 2023-09-19 PROCEDURE — 93018 CV STRESS TEST I&R ONLY: CPT | Mod: MH

## 2023-09-19 PROCEDURE — A9500: CPT

## 2023-09-19 PROCEDURE — 93017 CV STRESS TEST TRACING ONLY: CPT

## 2023-09-19 PROCEDURE — 78452 HT MUSCLE IMAGE SPECT MULT: CPT | Mod: MH

## 2023-09-19 PROCEDURE — 78452 HT MUSCLE IMAGE SPECT MULT: CPT | Mod: 26,MH

## 2023-09-19 PROCEDURE — 93016 CV STRESS TEST SUPVJ ONLY: CPT | Mod: MH

## 2023-10-30 NOTE — H&P PST ADULT - PROBLEM SELECTOR PROBLEM 1
Patient was last seen 7/20/23. She had barbotage procedure on 6/8/23 for left shoulder calcific tendinopathy. Please see patient update via MyChart and advise if clinic follow up is warranted, or what next steps would be.     Liana Nelson MSA, ATC  Certified Athletic Trainer   
Parotid mass

## 2024-03-17 NOTE — ASU PATIENT PROFILE, ADULT - PSH
Cataract, left  2015  H/O dilation and curettage  2013, 1991  H/O total hysterectomy  2013  History of tonsillectomy and adenoidectomy  1951
[facility] TEAM 2

## 2024-11-06 NOTE — ED PROVIDER NOTE - CLINICAL SUMMARY MEDICAL DECISION MAKING FREE TEXT BOX
Spoke to spouse, has not requested anything from [employer].  Call not returned to [address].   75 y/o F PMH HTN HLD presents to ED c/o chest pain onset last night while resting in bed lasting 8-10 minutes, denies associated n/v, sob, palpitations. Denies hx cardiac stents, recent travel, surgeries, immobilization. last stress test 2018 (presurgical). concern for ACS, unstable angina, ptx, pna. plan labs CXR EKG 77 y/o F PMH HTN HLD presents to ED c/o chest pain onset ~5am while resting in bed lasting 8-10 minutes, now resolved and has not recurred. Denies associated n/v, sob, palpitations. Denies hx cardiac stents, recent travel, surgeries, immobilization. last stress test 2018 (presurgical). concern for ACS, ptx, pna, MSK, GI. plan labs CXR EKG

## 2025-03-13 ENCOUNTER — INPATIENT (INPATIENT)
Facility: HOSPITAL | Age: 80
LOS: 3 days | Discharge: HOME | DRG: 446 | End: 2025-03-17
Attending: HOSPITALIST | Admitting: HOSPITALIST
Payer: MEDICARE

## 2025-03-13 VITALS
DIASTOLIC BLOOD PRESSURE: 82 MMHG | OXYGEN SATURATION: 93 % | SYSTOLIC BLOOD PRESSURE: 143 MMHG | TEMPERATURE: 98 F | HEART RATE: 106 BPM | RESPIRATION RATE: 18 BRPM | HEIGHT: 65 IN | WEIGHT: 210.1 LBS

## 2025-03-13 DIAGNOSIS — Z98.890 OTHER SPECIFIED POSTPROCEDURAL STATES: Chronic | ICD-10-CM

## 2025-03-13 DIAGNOSIS — H26.9 UNSPECIFIED CATARACT: Chronic | ICD-10-CM

## 2025-03-13 DIAGNOSIS — K80.50 CALCULUS OF BILE DUCT WITHOUT CHOLANGITIS OR CHOLECYSTITIS WITHOUT OBSTRUCTION: ICD-10-CM

## 2025-03-13 LAB
ALBUMIN SERPL ELPH-MCNC: 3.1 G/DL — LOW (ref 3.3–5)
ALP SERPL-CCNC: 438 U/L — HIGH (ref 40–120)
ALT FLD-CCNC: 685 U/L — HIGH (ref 12–78)
ANION GAP SERPL CALC-SCNC: 4 MMOL/L — LOW (ref 5–17)
APTT BLD: 29.5 SEC — SIGNIFICANT CHANGE UP (ref 24.5–35.6)
AST SERPL-CCNC: 672 U/L — HIGH (ref 15–37)
BILIRUB SERPL-MCNC: 5.4 MG/DL — HIGH (ref 0.2–1.2)
BUN SERPL-MCNC: 9 MG/DL — SIGNIFICANT CHANGE UP (ref 7–23)
CALCIUM SERPL-MCNC: 9.1 MG/DL — SIGNIFICANT CHANGE UP (ref 8.5–10.1)
CHLORIDE SERPL-SCNC: 104 MMOL/L — SIGNIFICANT CHANGE UP (ref 96–108)
CO2 SERPL-SCNC: 27 MMOL/L — SIGNIFICANT CHANGE UP (ref 22–31)
CREAT SERPL-MCNC: 1 MG/DL — SIGNIFICANT CHANGE UP (ref 0.5–1.3)
EGFR: 57 ML/MIN/1.73M2 — LOW
EGFR: 57 ML/MIN/1.73M2 — LOW
EOSINOPHIL # BLD AUTO: 0.15 K/UL — SIGNIFICANT CHANGE UP (ref 0–0.5)
EOSINOPHIL NFR BLD AUTO: 1.8 % — SIGNIFICANT CHANGE UP (ref 0–6)
GLUCOSE SERPL-MCNC: 97 MG/DL — SIGNIFICANT CHANGE UP (ref 70–99)
HCT VFR BLD CALC: 43 % — SIGNIFICANT CHANGE UP (ref 34.5–45)
HGB BLD-MCNC: 14.6 G/DL — SIGNIFICANT CHANGE UP (ref 11.5–15.5)
IMM GRANULOCYTES NFR BLD AUTO: 0.7 % — SIGNIFICANT CHANGE UP (ref 0–0.9)
INR BLD: 1.01 RATIO — SIGNIFICANT CHANGE UP (ref 0.85–1.16)
LIDOCAIN IGE QN: 17 U/L — SIGNIFICANT CHANGE UP (ref 13–75)
LYMPHOCYTES # BLD AUTO: 0.66 K/UL — LOW (ref 1–3.3)
LYMPHOCYTES # BLD AUTO: 8 % — LOW (ref 13–44)
MCHC RBC-ENTMCNC: 34 G/DL — SIGNIFICANT CHANGE UP (ref 32–36)
MCV RBC AUTO: 90.7 FL — SIGNIFICANT CHANGE UP (ref 80–100)
MONOCYTES # BLD AUTO: 0.42 K/UL — SIGNIFICANT CHANGE UP (ref 0–0.9)
MONOCYTES NFR BLD AUTO: 5.1 % — SIGNIFICANT CHANGE UP (ref 2–14)
NEUTROPHILS # BLD AUTO: 6.89 K/UL — SIGNIFICANT CHANGE UP (ref 1.8–7.4)
NRBC BLD AUTO-RTO: 0 /100 WBCS — SIGNIFICANT CHANGE UP (ref 0–0)
PLATELET # BLD AUTO: 217 K/UL — SIGNIFICANT CHANGE UP (ref 150–400)
POTASSIUM SERPL-MCNC: 3.8 MMOL/L — SIGNIFICANT CHANGE UP (ref 3.5–5.3)
POTASSIUM SERPL-SCNC: 3.8 MMOL/L — SIGNIFICANT CHANGE UP (ref 3.5–5.3)
PROT SERPL-MCNC: 6.8 G/DL — SIGNIFICANT CHANGE UP (ref 6–8.3)
PROTHROM AB SERPL-ACNC: 11.8 SEC — SIGNIFICANT CHANGE UP (ref 9.9–13.4)
RBC # BLD: 4.74 M/UL — SIGNIFICANT CHANGE UP (ref 3.8–5.2)
RBC # FLD: 13.4 % — SIGNIFICANT CHANGE UP (ref 10.3–14.5)
SODIUM SERPL-SCNC: 135 MMOL/L — SIGNIFICANT CHANGE UP (ref 135–145)
WBC # BLD: 8.21 K/UL — SIGNIFICANT CHANGE UP (ref 3.8–10.5)
WBC # FLD AUTO: 8.21 K/UL — SIGNIFICANT CHANGE UP (ref 3.8–10.5)

## 2025-03-13 PROCEDURE — 71045 X-RAY EXAM CHEST 1 VIEW: CPT | Mod: 26

## 2025-03-13 PROCEDURE — 74181 MRI ABDOMEN W/O CONTRAST: CPT | Mod: 26

## 2025-03-13 PROCEDURE — 99285 EMERGENCY DEPT VISIT HI MDM: CPT | Mod: FS

## 2025-03-13 PROCEDURE — 74177 CT ABD & PELVIS W/CONTRAST: CPT | Mod: 26

## 2025-03-13 RX ORDER — MELATONIN 5 MG
3 TABLET ORAL AT BEDTIME
Refills: 0 | Status: DISCONTINUED | OUTPATIENT
Start: 2025-03-13 | End: 2025-03-17

## 2025-03-13 RX ORDER — LEVOTHYROXINE SODIUM 300 MCG
75 TABLET ORAL DAILY
Refills: 0 | Status: DISCONTINUED | OUTPATIENT
Start: 2025-03-13 | End: 2025-03-17

## 2025-03-13 RX ORDER — ALPRAZOLAM 0.5 MG
1 TABLET, EXTENDED RELEASE 24 HR ORAL
Refills: 0 | DISCHARGE

## 2025-03-13 RX ORDER — ONDANSETRON HCL/PF 4 MG/2 ML
4 VIAL (ML) INJECTION EVERY 8 HOURS
Refills: 0 | Status: DISCONTINUED | OUTPATIENT
Start: 2025-03-13 | End: 2025-03-17

## 2025-03-13 RX ORDER — LATANOPROST PF 0.05 MG/ML
1 SOLUTION/ DROPS OPHTHALMIC AT BEDTIME
Refills: 0 | Status: DISCONTINUED | OUTPATIENT
Start: 2025-03-13 | End: 2025-03-17

## 2025-03-13 RX ORDER — LATANOPROST PF 0.05 MG/ML
1 SOLUTION/ DROPS OPHTHALMIC
Refills: 0 | DISCHARGE

## 2025-03-13 RX ORDER — ACETAMINOPHEN 500 MG/5ML
650 LIQUID (ML) ORAL EVERY 6 HOURS
Refills: 0 | Status: DISCONTINUED | OUTPATIENT
Start: 2025-03-13 | End: 2025-03-17

## 2025-03-13 RX ORDER — SODIUM CHLORIDE 9 G/1000ML
1000 INJECTION, SOLUTION INTRAVENOUS
Refills: 0 | Status: DISCONTINUED | OUTPATIENT
Start: 2025-03-13 | End: 2025-03-14

## 2025-03-13 RX ORDER — HEPARIN SODIUM 1000 [USP'U]/ML
5000 INJECTION INTRAVENOUS; SUBCUTANEOUS EVERY 8 HOURS
Refills: 0 | Status: DISCONTINUED | OUTPATIENT
Start: 2025-03-13 | End: 2025-03-14

## 2025-03-13 RX ORDER — LEVOTHYROXINE SODIUM 300 MCG
1 TABLET ORAL
Refills: 0 | DISCHARGE

## 2025-03-13 RX ORDER — ASPIRIN 325 MG
81 TABLET ORAL DAILY
Refills: 0 | Status: DISCONTINUED | OUTPATIENT
Start: 2025-03-13 | End: 2025-03-14

## 2025-03-13 RX ORDER — ALPRAZOLAM 0.5 MG
0.25 TABLET, EXTENDED RELEASE 24 HR ORAL AT BEDTIME
Refills: 0 | Status: DISCONTINUED | OUTPATIENT
Start: 2025-03-13 | End: 2025-03-17

## 2025-03-13 RX ORDER — TIMOLOL MALEATE 6.8 MG/ML
1 SOLUTION OPHTHALMIC DAILY
Refills: 0 | Status: DISCONTINUED | OUTPATIENT
Start: 2025-03-13 | End: 2025-03-17

## 2025-03-13 RX ORDER — MAGNESIUM, ALUMINUM HYDROXIDE 200-200 MG
30 TABLET,CHEWABLE ORAL EVERY 4 HOURS
Refills: 0 | Status: DISCONTINUED | OUTPATIENT
Start: 2025-03-13 | End: 2025-03-17

## 2025-03-13 RX ADMIN — Medication 1000 MILLILITER(S): at 15:15

## 2025-03-13 RX ADMIN — Medication 2 MILLIGRAM(S): at 20:15

## 2025-03-13 RX ADMIN — HEPARIN SODIUM 5000 UNIT(S): 1000 INJECTION INTRAVENOUS; SUBCUTANEOUS at 22:04

## 2025-03-13 RX ADMIN — Medication 2 MILLIGRAM(S): at 21:45

## 2025-03-13 NOTE — ED ADULT NURSE NOTE - OBJECTIVE STATEMENT
Received the patient in the Er. patient is alert and oriented. C/O abdominal pain on and off for a week. had blood work and sonogram done yesterday. Patient is send by PMD for inflamed gall bladder.

## 2025-03-13 NOTE — H&P ADULT - HISTORY OF PRESENT ILLNESS
79F active smoker, COPD, HTN, HLD, glaucoma, hypothyroid p/w abd pain. Pt reports episodes of squeezing like pain for several days. Associated with nausea but no vomiting. pt points to her epigastric area when describing the pain but also reports feeling like a band is circling and squeezing her abdomen. outpatient US with GB wall thickening to 9mm, sent in by GI.

## 2025-03-13 NOTE — H&P ADULT - ASSESSMENT
79F active smoker, COPD, HTN, HLD, glaucoma, hypothyroid p/w acute cholecystitis with possible CBD obstruction  symptoms intermittent  lfts elevated  CT with evidence of distal CBD stone, but not seen on mrcp  clears tonight then npo at [facility] for possible ERCP and/or lap melo  GI and surgery following  pain control  antiemetics  IVF    smoking  discussed cessation  pt not interested    COPD  continue inhalers    HTN/HLD  continue verapamil  hold statin for now    hypothyroid   continue synthroid

## 2025-03-13 NOTE — CONSULT NOTE ADULT - SUBJECTIVE AND OBJECTIVE BOX
SURGERY PA CONSULT NOTE:    CHIEF COMPLAINT:  Patient is a 79y old  Female who presents with a chief complaint of abdominal pain    HPI:  HPI: Patient is a 79-year-old female with past medical history of hypertension hyperlipidemia COPD glaucoma hypothyroidism advised to come to ED by her GI Dr. Cuba for acute cholecystitis.  Patient states she is been having abdominal pain for about a week and a half with associated nausea but no vomiting denies any fever chills chest pain shortness of breath feels like she has a band around her stomach which is worse after she eats.  Patient currently states she does not have any pain.  Patient had an outpatient ultrasound study and received a call to negative ordered emergency room and Dr. Venegas was contacted by her GI.    INTERVAL HPI:  HPI as above.     PAST MEDICAL HISTORY:  PAST MEDICAL & SURGICAL HISTORY:  Parotid mass      Hypothyroidism      Hypertension      Glaucoma      Uterine cancer  2013 s/p total   hysterectomy      Cold      COPD (chronic obstructive pulmonary disease)      Hypercholesteremia      HTN (hypertension)      HLD (hyperlipidemia)      H/O total hysterectomy  2013      H/O dilation and curettage  2013, 1991      Cataract, left  2015      History of tonsillectomy and adenoidectomy  1951      History of parathyroid surgery          PAST SURGICAL HISTORY:    REVIEW OF SYSTEMS:  CONSTITUTIONAL: No weakness, fevers or chills, no weight loss  EYES/ENT: No visual changes;  No vertigo or throat pain   NECK: No pain or stiffness  ENDOCRINE: No thyroid problems  RESPIRATORY: No cough, wheezing, hemoptysis; No shortness of breath  CARDIOVASCULAR: No chest pain or palpitations  GASTROINTESTINAL: See HPI  GENITOURINARY: No dysuria, frequency or hematuria  MSK: No joint swelling  NEUROLOGICAL: No numbness or weakness  SKIN: No itching, burning, rashes, or lesions   All other review of systems is negative unless indicated above.    MEDICATIONS:  Home Medications:  Aspirin Low Dose 81 mg oral delayed release tablet: 1 tab(s) orally once a day in morning - last dose 3/1/2018 (12 Mar 2018 07:35)  calcium: 600 milligram(s) orally 2 times a day (12 Mar 2018 07:35)  fluticasone 50 mcg/inh nasal spray: 2 spray(s) nasal once a day in morning (12 Mar 2018 07:35)  hydroCHLOROthiazide 25 mg oral tablet: 1 tab(s) orally once a day in morning (12 Mar 2018 07:35)  l-thyroxine: 75 milligram(s) orally once a day in morning (12 Mar 2018 07:35)  raNITIdine 150 mg oral capsule: 1 cap(s) orally once a day in morning (12 Mar 2018 07:35)  Spiriva Respimat 2.5 mcg/inh inhalation aerosol: 2 puff(s) inhaled once a day in morning (12 Mar 2018 07:35)  TheraCran HP oral capsule: 240 milligram(s) orally once a day in morning. Instructed to stop on 3/1/18. (12 Mar 2018 07:35)  timolol hemihydrate 0.5% ophthalmic solution: 1 drop(s) to each affected eye once a day- in morning (12 Mar 2018 07:35)  verapamil 240 mg/24 hours oral capsule, extended release: 1 cap(s) orally once a day in morning (12 Mar 2018 07:35)  Vitamin B12 1000 mcg oral tablet: 1 tab(s) orally once a day in morning (12 Mar 2018 07:35)  vitamin D: 400 milligram(s) orally once a day in morning (12 Mar 2018 07:35)  Vytorin 10 mg-40 mg oral tablet: 1 tab(s) orally once a day in morning (12 Mar 2018 07:35)    MEDICATIONS  (STANDING):  lactated ringers. 1000 milliLiter(s) (75 mL/Hr) IV Continuous <Continuous>    MEDICATIONS  (PRN):  acetaminophen     Tablet .. 650 milliGRAM(s) Oral every 6 hours PRN Temp greater or equal to 38C (100.4F), Mild Pain (1 - 3)  aluminum hydroxide/magnesium hydroxide/simethicone Suspension 30 milliLiter(s) Oral every 4 hours PRN Dyspepsia  melatonin 3 milliGRAM(s) Oral at bedtime PRN Insomnia  morphine  - Injectable 2 milliGRAM(s) IV Push every 4 hours PRN Severe Pain (7 - 10)  ondansetron Injectable 4 milliGRAM(s) IV Push every 8 hours PRN Nausea and/or Vomiting      ALLERGIES:  Allergies    No Known Drug Allergies  Seafood (Other)    Intolerances    adhesives (Other)      SOCIAL HISTORY:  Social History:    Smoking: Yes [x ]  No [ ]   1pk x 60yrs  ETOH  Yes [ ]  No [x ]  Social [ ]  DRUGS:  Yes [ ]  No [x ]  if so what______________    FAMILY HISTORY:  FAMILY HISTORY:  Family history of lung cancer (Aunt)    Family history of breast cancer (Aunt)    Family history of diabetes mellitus (Aunt)    Family history of congestive heart failure (Mother)    Family history of cirrhosis of liver (Father)        PHYSICAL EXAM:  Vital Signs Last 24 Hrs  T(C): 36.7 (13 Mar 2025 16:35), Max: 36.7 (13 Mar 2025 14:13)  T(F): 98 (13 Mar 2025 16:35), Max: 98.1 (13 Mar 2025 14:13)  HR: 104 (13 Mar 2025 16:35) (104 - 106)  BP: 157/86 (13 Mar 2025 16:35) (143/82 - 157/86)  BP(mean): --  RR: 18 (13 Mar 2025 16:35) (18 - 18)  SpO2: 93% (13 Mar 2025 16:35) (93% - 93%)    Parameters below as of 13 Mar 2025 16:35  Patient On (Oxygen Delivery Method): room air        CONSTITUTIONAL: No apparent distress, lying comfortably in bed  HEAD:  Atraumatic, normocephalic  EYES: EOMI, PERRLA, conjunctiva and sclera clear  ENMT: Oral mucosa with moist membranes. Normal dentition; no pharyngeal injection or exudates  NECK: Supple, symmetric and without tracheal deviation   RESP: No respiratory distress, no use of accessory muscles; CTA b/l, no WRR  CV: RRR, +S1S2, no MRG; no JVD; no peripheral edema  GI: Soft, NT, ND, no rebound, no guarding; no palpable masses; no hepatosplenomegaly; no hernia palpated  LYMPH: No cervical LAD or tenderness; no axillary LAD or tenderness; no inguinal LAD or tenderness  EXTREMITIES: 2+ peripheral pulses, no clubbing, cyanosis, or edema  PSYCH: A&O x3  NEUROLOGY: Non-focal, motor & sensory grossly intact  SKIN: Warm & dry, no rashes or lesions; no subcutaneous nodules or induration palpable    LABS:                        14.6   8.21  )-----------( 217      ( 13 Mar 2025 15:07 )             43.0     03-13    135  |  104  |  9   ----------------------------<  97  3.8   |  27  |  1.00    Ca    9.1      13 Mar 2025 15:07    TPro  6.8  /  Alb  3.1[L]  /  TBili  5.4[H]  /  DBili  x   /  AST  672[H]  /  ALT  685[H]  /  AlkPhos  438[H]  03-13    PT/INR - ( 13 Mar 2025 15:07 )   PT: 11.8 sec;   INR: 1.01 ratio         PTT - ( 13 Mar 2025 15:07 )  PTT:29.5 sec    Urinalysis Basic - ( 13 Mar 2025 15:07 )    Color: x / Appearance: x / SG: x / pH: x  Gluc: 97 mg/dL / Ketone: x  / Bili: x / Urobili: x   Blood: x / Protein: x / Nitrite: x   Leuk Esterase: x / RBC: x / WBC x   Sq Epi: x / Non Sq Epi: x / Bacteria: x      LIVER FUNCTIONS - ( 13 Mar 2025 15:07 )  Alb: 3.1 g/dL / Pro: 6.8 g/dL / ALK PHOS: 438 U/L / ALT: 685 U/L / AST: 672 U/L / GGT: x               RADIOLOGY & ADDITIONAL STUDIES:  < from: CT Abdomen and Pelvis w/ IV Cont (03.13.25 @ 16:17) >  ACC: [id number] EXAM:  CT ABDOMEN AND PELVIS IC   ORDERED BY: MARIA VICTORIA BARAJAS     PROCEDURE DATE:  03/13/2025          INTERPRETATION:  CLINICAL INFORMATION: 79 years  Female with abd pain.    COMPARISON: None.    CONTRAST/COMPLICATIONS:  IV Contrast: Omnipaque 350  90 cc administered   10 cc discarded  Oral Contrast: NONE  .    PROCEDURE:  CT of the Abdomen and Pelvis was performed.  Sagittal and coronal reformats were performed.    FINDINGS:  LOWER CHEST: Mild bibasilar dependent atelectasis.    LIVER: Within normal limits.  BILE DUCTS: Mild intrahepatic biliary duct distention. Common duct 8 mm   the. 2 to 3 mm distal common duct stone at the level of the ampulla   (301:45).  GALLBLADDER: 2.2 cm calcified gallstone in the gallbladder neck.   Pericholecystic fat stranding and fluid concerning for acute   cholecystitis.  SPLEEN: Within normal limits.  PANCREAS: Within normal limits.  ADRENALS: Within normal limits.  KIDNEYS/URETERS: Bilateral cysts and hypodensities too small to   characterize. No hydroureteronephrosis.    BLADDER: Within normal limits.  REPRODUCTIVE ORGANS: Hysterectomy.    BOWEL: No bowel obstruction. Appendix is not visualized. No evidence of   inflammation in the pericecal region.. Moderate colonic stool burden.  PERITONEUM/RETROPERITONEUM: Within normal limits.  VESSELS: Atherosclerotic changes.  LYMPH NODES: No lymphadenopathy.  ABDOMINAL WALL: Small fat-containing umbilical hernia.  BONES: Degenerative changes. Grade 1 L4-5 anterolisthesis.    IMPRESSION:  2 to 3 mm distal common duct stone with mild intra and extrahepatic   biliary distention.    Large gallstone. Pericholecystic fluid concerning for acute cholecystitis.    Findings were discussed with KRISTY Gruber 3/13/2025 4:30 PM by Dr. Arminda Holm with read back confirmation.    --- End of Report ---    < end of copied text >    ASSESSMENT:  79yFemale with PMHx hypertension, hyperlipidemia, COPD, R glaucoma, hypothyroidism presents to the ED after abdominal pain x1week and abnormal outpatient RUQ ultrasound results along with elevated LFTs and bilirubin. Patient sent to ED on behalf of GI doctor to be evaluated by Dr. Venegas. Abd CT significant for 2 to 3 mm distal common duct stone with mild intra and extrahepatic biliary distention. Large gallstone. Pericholecystic fluid concerning for acute cholecystitis.    PLAN:  - f/u MRCP   - GI recs appreciated; plan pending MRCP results  - VSSAF  - Diet CLD and NPO after midnight for MRCP  - IVF  - TATE  - Pain regimen/supportive care  - VTE ppx    Case discussed with Dr. Venegas

## 2025-03-13 NOTE — H&P ADULT - NSICDXFAMILYHX_GEN_ALL_CORE_FT
FAMILY HISTORY:  Father  Still living? No  Family history of cirrhosis of liver, Age at diagnosis: Age Unknown    Mother  Still living? No  Family history of congestive heart failure, Age at diagnosis: Age Unknown    Aunt  Still living? No  Family history of breast cancer, Age at diagnosis: Age Unknown  Family history of diabetes mellitus, Age at diagnosis: Age Unknown  Family history of lung cancer, Age at diagnosis: Age Unknown

## 2025-03-13 NOTE — H&P ADULT - NSICDXPASTSURGICALHX_GEN_ALL_CORE_FT
PAST SURGICAL HISTORY:  Cataract, left 2015    H/O dilation and curettage 2013, 1991    H/O total hysterectomy 2013    History of parathyroid surgery     History of tonsillectomy and adenoidectomy 1951

## 2025-03-13 NOTE — H&P ADULT - NSICDXPASTMEDICALHX_GEN_ALL_CORE_FT
PAST MEDICAL HISTORY:  Cold     COPD (chronic obstructive pulmonary disease)     Glaucoma     HLD (hyperlipidemia)     HTN (hypertension)     Hypercholesteremia     Hypertension     Hypothyroidism     Parotid mass     Uterine cancer 2013 s/p total   hysterectomy

## 2025-03-13 NOTE — ED PROVIDER NOTE - ATTENDING APP SHARED VISIT CONTRIBUTION OF CARE
78yo female sent in from GI doc for elevated lft and bili, with intermittent abd pain no fever, chills  exam: abd soft non tender no rebound or gaurding  plan: labs, xr, ct, sx admit  agree with assessment and plan of PA

## 2025-03-13 NOTE — ED ADULT TRIAGE NOTE - CHIEF COMPLAINT QUOTE
I have had stomach problems since last friday.  I felt like I needed to vomit but I did not.  I do think I am dehydrated.  I saw my doctor yesterday and they sent me for abdominal sonogram which reportedly revealed issues with my gallbladder needs to come out.  I was told to come here directly and have Dr. Theo yanez.  right now, I madelaine like no pain.  it feels like a band around my stomach / waist.  I had some increase in pain after eating some toast last night.

## 2025-03-13 NOTE — H&P ADULT - NSHPPHYSICALEXAM_GEN_ALL_CORE
PHYSICAL EXAM:  GENERAL: NAD   EYES: conjunctiva and sclera clear  ENMT: Moist mucous membranes  NECK: Supple, No JVD, Normal thyroid  CHEST/LUNG: non labored, cta b/l  HEART: Regular rate and rhythm; No murmurs, rubs, or gallops  ABDOMEN: Soft, Nontender, Nondistended; Bowel sounds present  EXTREMITIES:  2+ Peripheral Pulses, No clubbing, cyanosis, or edema  LYMPH: No lymphadenopathy noted  SKIN: No rashes or lesions

## 2025-03-13 NOTE — ED PROVIDER NOTE - OBJECTIVE STATEMENT
Patient is a 79-year-old female with past medical history of hypertension hyperlipidemia COPD glaucoma hypothyroidism advised to come to ED by her GI Dr. Cuba for acute cholecystitis.  Patient states she is been having abdominal pain for about a week and a half with associated nausea but no vomiting denies any fever chills chest pain shortness of breath feels like she has a band around her stomach which is worse after she eats.  Patient currently states she does not have any pain.  Patient had an outpatient ultrasound study and received a call to negative ordered emergency room and Dr. Venegas was contacted by her GI

## 2025-03-14 LAB
ALBUMIN SERPL ELPH-MCNC: 2.8 G/DL — LOW (ref 3.3–5)
ALP SERPL-CCNC: 419 U/L — HIGH (ref 40–120)
ALT FLD-CCNC: 539 U/L — HIGH (ref 12–78)
ANION GAP SERPL CALC-SCNC: 4 MMOL/L — LOW (ref 5–17)
AST SERPL-CCNC: 479 U/L — HIGH (ref 15–37)
BASOPHILS # BLD AUTO: 0.03 K/UL — SIGNIFICANT CHANGE UP (ref 0–0.2)
BILIRUB SERPL-MCNC: 4.2 MG/DL — HIGH (ref 0.2–1.2)
BUN SERPL-MCNC: 6 MG/DL — LOW (ref 7–23)
CALCIUM SERPL-MCNC: 8.4 MG/DL — LOW (ref 8.5–10.1)
CHLORIDE SERPL-SCNC: 108 MMOL/L — SIGNIFICANT CHANGE UP (ref 96–108)
CO2 SERPL-SCNC: 28 MMOL/L — SIGNIFICANT CHANGE UP (ref 22–31)
CREAT SERPL-MCNC: 0.81 MG/DL — SIGNIFICANT CHANGE UP (ref 0.5–1.3)
EGFR: 74 ML/MIN/1.73M2 — SIGNIFICANT CHANGE UP
EGFR: 74 ML/MIN/1.73M2 — SIGNIFICANT CHANGE UP
EOSINOPHIL # BLD AUTO: 0.14 K/UL — SIGNIFICANT CHANGE UP (ref 0–0.5)
EOSINOPHIL NFR BLD AUTO: 2.1 % — SIGNIFICANT CHANGE UP (ref 0–6)
GLUCOSE SERPL-MCNC: 90 MG/DL — SIGNIFICANT CHANGE UP (ref 70–99)
HCT VFR BLD CALC: 39 % — SIGNIFICANT CHANGE UP (ref 34.5–45)
HGB BLD-MCNC: 13.6 G/DL — SIGNIFICANT CHANGE UP (ref 11.5–15.5)
IMM GRANULOCYTES NFR BLD AUTO: 0.5 % — SIGNIFICANT CHANGE UP (ref 0–0.9)
LYMPHOCYTES # BLD AUTO: 0.71 K/UL — LOW (ref 1–3.3)
LYMPHOCYTES # BLD AUTO: 10.7 % — LOW (ref 13–44)
MCHC RBC-ENTMCNC: 31.6 PG — SIGNIFICANT CHANGE UP (ref 27–34)
MCHC RBC-ENTMCNC: 34.9 G/DL — SIGNIFICANT CHANGE UP (ref 32–36)
MCV RBC AUTO: 90.5 FL — SIGNIFICANT CHANGE UP (ref 80–100)
MONOCYTES # BLD AUTO: 0.5 K/UL — SIGNIFICANT CHANGE UP (ref 0–0.9)
MONOCYTES NFR BLD AUTO: 7.6 % — SIGNIFICANT CHANGE UP (ref 2–14)
NEUTROPHILS # BLD AUTO: 5.2 K/UL — SIGNIFICANT CHANGE UP (ref 1.8–7.4)
NEUTROPHILS NFR BLD AUTO: 78.6 % — HIGH (ref 43–77)
NRBC BLD AUTO-RTO: 0 /100 WBCS — SIGNIFICANT CHANGE UP (ref 0–0)
PLATELET # BLD AUTO: 176 K/UL — SIGNIFICANT CHANGE UP (ref 150–400)
POTASSIUM SERPL-MCNC: 3.1 MMOL/L — LOW (ref 3.5–5.3)
POTASSIUM SERPL-SCNC: 3.1 MMOL/L — LOW (ref 3.5–5.3)
PROT SERPL-MCNC: 5.9 G/DL — LOW (ref 6–8.3)
RBC # BLD: 4.31 M/UL — SIGNIFICANT CHANGE UP (ref 3.8–5.2)
RBC # FLD: 13.5 % — SIGNIFICANT CHANGE UP (ref 10.3–14.5)
SODIUM SERPL-SCNC: 140 MMOL/L — SIGNIFICANT CHANGE UP (ref 135–145)
WBC # BLD: 6.61 K/UL — SIGNIFICANT CHANGE UP (ref 3.8–10.5)
WBC # FLD AUTO: 6.61 K/UL — SIGNIFICANT CHANGE UP (ref 3.8–10.5)

## 2025-03-14 PROCEDURE — 76705 ECHO EXAM OF ABDOMEN: CPT | Mod: 26

## 2025-03-14 RX ORDER — HEPARIN SODIUM 1000 [USP'U]/ML
5000 INJECTION INTRAVENOUS; SUBCUTANEOUS EVERY 8 HOURS
Refills: 0 | Status: DISCONTINUED | OUTPATIENT
Start: 2025-03-14 | End: 2025-03-16

## 2025-03-14 RX ADMIN — Medication 3 MILLIGRAM(S): at 23:51

## 2025-03-14 RX ADMIN — HEPARIN SODIUM 5000 UNIT(S): 1000 INJECTION INTRAVENOUS; SUBCUTANEOUS at 15:03

## 2025-03-14 RX ADMIN — HEPARIN SODIUM 5000 UNIT(S): 1000 INJECTION INTRAVENOUS; SUBCUTANEOUS at 21:28

## 2025-03-14 RX ADMIN — Medication 100 MILLIEQUIVALENT(S): at 13:04

## 2025-03-14 RX ADMIN — TIMOLOL MALEATE 1 DROP(S): 6.8 SOLUTION OPHTHALMIC at 12:44

## 2025-03-14 RX ADMIN — Medication 100 MILLIEQUIVALENT(S): at 11:21

## 2025-03-14 RX ADMIN — SODIUM CHLORIDE 75 MILLILITER(S): 9 INJECTION, SOLUTION INTRAVENOUS at 01:38

## 2025-03-14 RX ADMIN — Medication 100 MILLIEQUIVALENT(S): at 15:02

## 2025-03-14 RX ADMIN — Medication 1 DOSE(S): at 21:28

## 2025-03-14 RX ADMIN — LATANOPROST PF 1 DROP(S): 0.05 SOLUTION/ DROPS OPHTHALMIC at 21:29

## 2025-03-14 RX ADMIN — Medication 40 MILLIEQUIVALENT(S): at 18:21

## 2025-03-14 NOTE — CASE MANAGEMENT PROGRESS NOTE - NSCMPROGRESSNOTE_GEN_ALL_CORE
Discussed pt on rounds, pt remains acute, awaiting further testing, NPO for MRCP and/ or lap melo, IVF. CM will continue to collaborate with interdisciplinary team and remain available to assist.

## 2025-03-14 NOTE — CONSULT NOTE ADULT - SUBJECTIVE AND OBJECTIVE BOX
[facility]  EUGENIA De La Rosa S. Shah, Y. Patel, G. [facility]  [phone]    JACOB GUAJARDO  79y, Female  [id number]    HPI--  HPI:  79F active smoker, COPD, HTN, HLD, glaucoma, hypothyroid p/w abd pain. Pt reports episodes of squeezing like pain for several days. Associated with nausea but no vomiting. pt points to her epigastric area when describing the pain but also reports feeling like a band is circling and squeezing her abdomen. outpatient US with GB wall thickening to 9mm, sent in by GI.  (13 Mar 2025 19:15)    ID c/s for further evaluation  Possible acute cholecystitis      Active Medications--  acetaminophen     Tablet .. 650 milliGRAM(s) Oral every 6 hours PRN  ALPRAZolam 0.25 milliGRAM(s) Oral at bedtime PRN  aluminum hydroxide/magnesium hydroxide/simethicone Suspension 30 milliLiter(s) Oral every 4 hours PRN  fluticasone propionate/ salmeterol 250-50 MICROgram(s) Diskus 1 Dose(s) Inhalation two times a day  lactated ringers. 1000 milliLiter(s) IV Continuous <Continuous>  latanoprost 0.005% Ophthalmic Solution 1 Drop(s) Left EYE at bedtime  levothyroxine 75 MICROGram(s) Oral daily  melatonin 3 milliGRAM(s) Oral at bedtime PRN  morphine  - Injectable 2 milliGRAM(s) IV Push every 4 hours PRN  ondansetron Injectable 4 milliGRAM(s) IV Push every 8 hours PRN  potassium chloride  10 mEq/100 mL IVPB 10 milliEquivalent(s) IV Intermittent every 1 hour  timolol 0.5% Solution 1 Drop(s) Left EYE daily  verapamil  milliGRAM(s) Oral daily    Antimicrobials:     Immunologic:     ROS:  CONSTITUTIONAL: No fevers or chills. No weakness or headache. No weight changes.  EYES/ENT: No visual or hearing changes. No sore throat or throat pain .  NECK: No pain or stiffness  RESPIRATORY: No cough, wheezing, or hemoptysis. No shortness of breath  CARDIOVASCULAR: No chest pain or palpitations  GASTROINTESTINAL: No abdominal pain. No nausea or vomiting. No diarrhea or constipation.  GENITOURINARY: No dysuria, frequency or hematuria  NEUROLOGICAL: No numbness or weakness  SKIN: No itching or rashes  PSYCHIATRIC: Pleasant. Appropriate affect    Allergies: No Known Drug Allergies  Seafood (Other)    PMH -- Parotid mass    Hypothyroidism    Hypertension    Glaucoma    Uterine cancer    Cold    COPD (chronic obstructive pulmonary disease)    Hypercholesteremia    HTN (hypertension)    HLD (hyperlipidemia)      PSH -- H/O total hysterectomy    H/O dilation and curettage    Cataract, left    History of tonsillectomy and adenoidectomy    History of parathyroid surgery      FH -- Family history of lung cancer (Aunt)    Family history of breast cancer (Aunt)    Family history of diabetes mellitus (Aunt)    Family history of congestive heart failure (Mother)    Family history of cirrhosis of liver (Father)      Social History --  EtOH: denies   Tobacco: denies   Drug Use: denies     Travel/Environmental/Occupational History:    Physical Exam--  Vital Signs Last 24 Hrs  T(F): 98.1 (14 Mar 2025 06:25), Max: 98.3 (13 Mar 2025 22:31)  HR: 88 (14 Mar 2025 06:25) (88 - 106)  BP: 137/75 (14 Mar 2025 06:25) (137/75 - 157/86)  RR: 17 (14 Mar 2025 06:25) (16 - 18)  SpO2: 93% (14 Mar 2025 06:25) (92% - 93%)  General: nontoxic-appearing, no acute distress  HEENT: NC/AT, EOMI  Lungs: no use resp acc muscles  Heart: Regular rate and rhythm.   Abdomen: Soft. Nondistended. Nontender.   Extremities: No cyanosis or clubbing. No edema.   Skin: Warm. Dry. Good turgor. No rash.     Laboratory & Imaging Data:  CBC:                       13.6   6.61  )-----------( 176      ( 14 Mar 2025 04:30 )             39.0     CMP: 03-14    140  |  108  |  6[L]  ----------------------------<  90  3.1[L]   |  28  |  0.81    Ca    8.4[L]      14 Mar 2025 04:30    TPro  5.9[L]  /  Alb  2.8[L]  /  TBili  4.2[H]  /  DBili  x   /  AST  479[H]  /  ALT  539[H]  /  AlkPhos  419[H]  03-14    LIVER FUNCTIONS - ( 14 Mar 2025 04:30 )  Alb: 2.8 g/dL / Pro: 5.9 g/dL / ALK PHOS: 419 U/L / ALT: 539 U/L / AST: 479 U/L / GGT: x           Urinalysis Basic - ( 14 Mar 2025 04:30 )    Color: x / Appearance: x / SG: x / pH: x  Gluc: 90 mg/dL / Ketone: x  / Bili: x / Urobili: x   Blood: x / Protein: x / Nitrite: x   Leuk Esterase: x / RBC: x / WBC x   Sq Epi: x / Non Sq Epi: x / Bacteria: x        Microbiology: reviewed        Radiology: reviewed    < from: Xray Chest 1 View AP/PA (03.13.25 @ 15:09) >    ACC: [id number] EXAM:  XR CHEST AP OR PA 1V   ORDERED BY: DOT GRUBER     PROCEDURE DATE:  03/13/2025          INTERPRETATION:  An AP portable semiupright chest radiograph was   performed for preoperative clearance.    Comparison is made to 6/24/2022.    The lungs are clear bilaterally. There are no infiltrates on either side.   There is no pneumothorax. There is no pleural fluid. There is no hilar or   mediastinal widening. The thoracic aorta is atherosclerotic. The heart is   not enlarged. There is no CHF. The bony thorax is grossly intact. No   adverse changes are seen compared to the previous exam.    IMPRESSION: Clear lungs with no acute cardiopulmonary abnormalities.    --- End of Report ---            JEET SAENZ MD; Attending Radiologist  This document has been electronically signed. Mar 13 2025  3:21PM    < end of copied text >  < from: CT Abdomen and Pelvis w/ IV Cont (03.13.25 @ 16:17) >    ACC: [id number] EXAM:  CT ABDOMEN AND PELVIS IC   ORDERED BY: MARIA VICTORIA BARAJAS     PROCEDURE DATE:  03/13/2025          INTERPRETATION:  CLINICAL INFORMATION: 79 years  Female with abd pain.    COMPARISON: None.    CONTRAST/COMPLICATIONS:  IV Contrast: Omnipaque 350  90 cc administered   10 cc discarded  Oral Contrast: NONE  .    PROCEDURE:  CT of the Abdomen and Pelvis was performed.  Sagittal and coronal reformats were performed.    FINDINGS:  LOWER CHEST: Mild bibasilar dependent atelectasis.    LIVER: Within normal limits.  BILE DUCTS: Mild intrahepatic biliary duct distention. Common duct 8 mm   the. 2 to 3 mm distal common duct stone at the level of the ampulla   (301:45).  GALLBLADDER: 2.2 cm calcified gallstone in the gallbladder neck.   Pericholecystic fat stranding and fluid concerning for acute   cholecystitis.  SPLEEN: Within normal limits.  PANCREAS: Within normal limits.  ADRENALS: Within normal limits.  KIDNEYS/URETERS: Bilateral cysts and hypodensities too small to   characterize. No hydroureteronephrosis.    BLADDER: Within normal limits.  REPRODUCTIVE ORGANS: Hysterectomy.    BOWEL: No bowel obstruction. Appendix is not visualized. No evidence of   inflammation in the pericecal region.. Moderate colonic stool burden.  PERITONEUM/RETROPERITONEUM: Within normal limits.  VESSELS: Atherosclerotic changes.  LYMPH NODES: No lymphadenopathy.  ABDOMINAL WALL: Small fat-containing umbilical hernia.  BONES: Degenerative changes. Grade 1 L4-5 anterolisthesis.    IMPRESSION:  2 to 3 mm distal common duct stone with mild intra and extrahepatic   biliary distention.    Large gallstone. Pericholecystic fluid concerning for acute cholecystitis.    Findings were discussed with KRISTY Gruber 3/13/2025 4:30 PM by Dr. Sea Villa with read back confirmation.    --- End of Report ---            SEA VILLA MD; Attending Radiologist  This document has been electronically signed. Mar 13 2025  4:33PM    < end of copied text >  < from: MR MRCP No Cont (03.13.25 @ 17:55) >    ACC: [id number] EXAM:  MR MRCP   ORDERED BY:  ANGY MORALES     PROCEDURE DATE:  03/13/2025          INTERPRETATION:  CLINICAL INFORMATION: Elevated total bilirubin, rule out   acute cholecystitis.    COMPARISON: CT abdomen and pelvis performed earlier today.    CONTRAST/COMPLICATIONS:  IV Contrast: NONE  Oral Contrast: NONE    PROCEDURE:  MRI of the abdomen was performed.  MRCP was performed.    FINDINGS:  LOWER CHEST: Within normal limits.    LIVER: Within normal limits.  BILE DUCTS: Intrahepatic biliary ductal dilatation with upper limits of   normal caliber extrahepatic bile duct measuring 8 mm in diameter. No   obstructing lesion or filling defect identified.  GALLBLADDER: There is a 3.3 cm stone likely impacted within the   gallbladder neckand mild gallbladder wall thickening.  SPLEEN: Within normal limits.  PANCREAS: Within normal limits.  ADRENALS: Within normal limits.  KIDNEYS/URETERS: There are several simple bilateral renal cysts measuring   up to 2.4 cm within the right kidney upper pole (2-17).    VISUALIZED PORTIONS:  BOWEL: Within normal limits.  PERITONEUM: No ascites.  VESSELS: Within normal limits.  RETROPERITONEUM/LYMPH NODES: No lymphadenopathy.  ABDOMINAL WALL: Within normal limits.  BONES: Within normal limits.    IMPRESSION:  1. There is a 3.3 cm stone likely impacted within the gallbladder neck   and mild bladder wall thickening, suspicious for cholecystitis.  2. Intrahepatic biliary ductal dilatation with upper limits of normal   extra hepatic biliary duct caliber without evidence of   choledocholithiasis or obstructing lesion. This is of uncertain etiology   and significance.    --- End of Report ---            DENIZ SMALL MD; Attending Radiologist  This document has been electronically signed. Mar [id number]  6:41PM    < end of copied text >  < from: US Abdomen Upper Quadrant Right (03.14.25 @ 09:50) >    ACC: [id number] EXAM:  US ABDOMEN RT UPR QUADRANT   ORDERED BY: GAB COTTRELL     PROCEDURE DATE:  03/14/2025          INTERPRETATION:  CLINICAL INFORMATION: Abdominal pain. Elevated LFTs.   Evaluate biliary tree.    COMPARISON: MRCP 3/13/2025.    TECHNIQUE: Sonography of the right upper quadrant.    FINDINGS:  Liver: Partially obscured by overlying shadowing.  Bile ducts: Common bile duct measures 8 mm.  Gallbladder: Large nonmobile gallstone in the neck. Wall thickening   measuring 6 mm. Negative sonographic White sign.  Pancreas: Visualized portions are within normal limits.  Right kidney: 10.0 cm. No hydronephrosis. Cortical thinning with   increased echogenicity. Mid pole cyst measuring 2.3 cm.  Ascites: None.  IVC: Visualized portions are within normal limits.    IMPRESSION:  Mild prominence of the common bile duct.    Large nonmobile gallstone in the neck with gallbladder wall thickening.    --- End of Report ---            JOSE TILLEY MD; Attending Radiologist  This document has been electronically signed. Mar 14 2025 10:14AM    < end of copied text >     [facility]  EUGENIA De La Rosa S. Shah, Y. Patel, G. [facility]  [phone]    JACOB GUAJARDO  79y, Female  [id number]    HPI--  HPI:  79F active smoker, COPD, HTN, HLD, glaucoma, hypothyroid p/w abd pain. Pt reports episodes of squeezing like pain for several days. Associated with nausea but no vomiting. pt points to her epigastric area when describing the pain but also reports feeling like a band is circling and squeezing her abdomen. outpatient US with GB wall thickening to 9mm, sent in by GI.  (13 Mar 2025 19:15)    ID c/s for further evaluation  Possible acute cholecystitis  Pt seen at bedside  Reporting feeling better       Active Medications--  acetaminophen     Tablet .. 650 milliGRAM(s) Oral every 6 hours PRN  ALPRAZolam 0.25 milliGRAM(s) Oral at bedtime PRN  aluminum hydroxide/magnesium hydroxide/simethicone Suspension 30 milliLiter(s) Oral every 4 hours PRN  fluticasone propionate/ salmeterol 250-50 MICROgram(s) Diskus 1 Dose(s) Inhalation two times a day  lactated ringers. 1000 milliLiter(s) IV Continuous <Continuous>  latanoprost 0.005% Ophthalmic Solution 1 Drop(s) Left EYE at bedtime  levothyroxine 75 MICROGram(s) Oral daily  melatonin 3 milliGRAM(s) Oral at bedtime PRN  morphine  - Injectable 2 milliGRAM(s) IV Push every 4 hours PRN  ondansetron Injectable 4 milliGRAM(s) IV Push every 8 hours PRN  potassium chloride  10 mEq/100 mL IVPB 10 milliEquivalent(s) IV Intermittent every 1 hour  timolol 0.5% Solution 1 Drop(s) Left EYE daily  verapamil  milliGRAM(s) Oral daily    Antimicrobials:     Immunologic:     ROS:  CONSTITUTIONAL: No fevers or chills. No weakness or headache. No weight changes.  EYES/ENT: No visual or hearing changes. No sore throat or throat pain .  NECK: No pain or stiffness  RESPIRATORY: No cough, wheezing, or hemoptysis. No shortness of breath  CARDIOVASCULAR: No chest pain or palpitations  GASTROINTESTINAL: No abdominal pain. No nausea or vomiting. No diarrhea or constipation.  GENITOURINARY: No dysuria, frequency or hematuria  NEUROLOGICAL: No numbness or weakness  SKIN: No itching or rashes  PSYCHIATRIC: Pleasant. Appropriate affect    Allergies: No Known Drug Allergies  Seafood (Other)    PMH -- Parotid mass    Hypothyroidism    Hypertension    Glaucoma    Uterine cancer    Cold    COPD (chronic obstructive pulmonary disease)    Hypercholesteremia    HTN (hypertension)    HLD (hyperlipidemia)      PSH -- H/O total hysterectomy    H/O dilation and curettage    Cataract, left    History of tonsillectomy and adenoidectomy    History of parathyroid surgery      FH -- Family history of lung cancer (Aunt)    Family history of breast cancer (Aunt)    Family history of diabetes mellitus (Aunt)    Family history of congestive heart failure (Mother)    Family history of cirrhosis of liver (Father)      Social History --  EtOH: denies   Tobacco: denies   Drug Use: denies     Travel/Environmental/Occupational History:    Physical Exam--  Vital Signs Last 24 Hrs  T(F): 98.1 (14 Mar 2025 06:25), Max: 98.3 (13 Mar 2025 22:31)  HR: 88 (14 Mar 2025 06:25) (88 - 106)  BP: 137/75 (14 Mar 2025 06:25) (137/75 - 157/86)  RR: 17 (14 Mar 2025 06:25) (16 - 18)  SpO2: 93% (14 Mar 2025 06:25) (92% - 93%)  General: nontoxic-appearing, no acute distress  HEENT: NC/AT, EOMI  Lungs: no use resp acc muscles  Heart: Regular rate and rhythm.   Abdomen: Soft. Nondistended. Nontender.   Extremities: No cyanosis or clubbing. No edema.   Skin: Warm. Dry. Good turgor. No rash.     Laboratory & Imaging Data:  CBC:                       13.6   6.61  )-----------( 176      ( 14 Mar 2025 04:30 )             39.0     CMP: 03-14    140  |  108  |  6[L]  ----------------------------<  90  3.1[L]   |  28  |  0.81    Ca    8.4[L]      14 Mar 2025 04:30    TPro  5.9[L]  /  Alb  2.8[L]  /  TBili  4.2[H]  /  DBili  x   /  AST  479[H]  /  ALT  539[H]  /  AlkPhos  419[H]  03-14    LIVER FUNCTIONS - ( 14 Mar 2025 04:30 )  Alb: 2.8 g/dL / Pro: 5.9 g/dL / ALK PHOS: 419 U/L / ALT: 539 U/L / AST: 479 U/L / GGT: x           Urinalysis Basic - ( 14 Mar 2025 04:30 )    Color: x / Appearance: x / SG: x / pH: x  Gluc: 90 mg/dL / Ketone: x  / Bili: x / Urobili: x   Blood: x / Protein: x / Nitrite: x   Leuk Esterase: x / RBC: x / WBC x   Sq Epi: x / Non Sq Epi: x / Bacteria: x        Microbiology: reviewed        Radiology: reviewed    < from: Xray Chest 1 View AP/PA (03.13.25 @ 15:09) >    ACC: [id number] EXAM:  XR CHEST AP OR PA 1V   ORDERED BY: DOT GRUBER     PROCEDURE DATE:  03/13/2025          INTERPRETATION:  An AP portable semiupright chest radiograph was   performed for preoperative clearance.    Comparison is made to 6/24/2022.    The lungs are clear bilaterally. There are no infiltrates on either side.   There is no pneumothorax. There is no pleural fluid. There is no hilar or   mediastinal widening. The thoracic aorta is atherosclerotic. The heart is   not enlarged. There is no CHF. The bony thorax is grossly intact. No   adverse changes are seen compared to the previous exam.    IMPRESSION: Clear lungs with no acute cardiopulmonary abnormalities.    --- End of Report ---            JEET SAENZ MD; Attending Radiologist  This document has been electronically signed. Mar 13 2025  3:21PM    < end of copied text >  < from: CT Abdomen and Pelvis w/ IV Cont (03.13.25 @ 16:17) >    ACC: [id number] EXAM:  CT ABDOMEN AND PELVIS IC   ORDERED BY: MARIA VICTORIA BARAJAS     PROCEDURE DATE:  03/13/2025          INTERPRETATION:  CLINICAL INFORMATION: 79 years  Female with abd pain.    COMPARISON: None.    CONTRAST/COMPLICATIONS:  IV Contrast: Omnipaque 350  90 cc administered   10 cc discarded  Oral Contrast: NONE  .    PROCEDURE:  CT of the Abdomen and Pelvis was performed.  Sagittal and coronal reformats were performed.    FINDINGS:  LOWER CHEST: Mild bibasilar dependent atelectasis.    LIVER: Within normal limits.  BILE DUCTS: Mild intrahepatic biliary duct distention. Common duct 8 mm   the. 2 to 3 mm distal common duct stone at the level of the ampulla   (301:45).  GALLBLADDER: 2.2 cm calcified gallstone in the gallbladder neck.   Pericholecystic fat stranding and fluid concerning for acute   cholecystitis.  SPLEEN: Within normal limits.  PANCREAS: Within normal limits.  ADRENALS: Within normal limits.  KIDNEYS/URETERS: Bilateral cysts and hypodensities too small to   characterize. No hydroureteronephrosis.    BLADDER: Within normal limits.  REPRODUCTIVE ORGANS: Hysterectomy.    BOWEL: No bowel obstruction. Appendix is not visualized. No evidence of   inflammation in the pericecal region.. Moderate colonic stool burden.  PERITONEUM/RETROPERITONEUM: Within normal limits.  VESSELS: Atherosclerotic changes.  LYMPH NODES: No lymphadenopathy.  ABDOMINAL WALL: Small fat-containing umbilical hernia.  BONES: Degenerative changes. Grade 1 L4-5 anterolisthesis.    IMPRESSION:  2 to 3 mm distal common duct stone with mild intra and extrahepatic   biliary distention.    Large gallstone. Pericholecystic fluid concerning for acute cholecystitis.    Findings were discussed with KRISTY Gruber 3/13/2025 4:30 PM by Dr. Sea Villa with read back confirmation.    --- End of Report ---            SEA VILLA MD; Attending Radiologist  This document has been electronically signed. Mar 13 2025  4:33PM    < end of copied text >  < from: MR MRCP No Cont (03.13.25 @ 17:55) >    ACC: [id number] EXAM:  MR MRCP   ORDERED BY:  ANGY MORALES     PROCEDURE DATE:  03/13/2025          INTERPRETATION:  CLINICAL INFORMATION: Elevated total bilirubin, rule out   acute cholecystitis.    COMPARISON: CT abdomen and pelvis performed earlier today.    CONTRAST/COMPLICATIONS:  IV Contrast: NONE  Oral Contrast: NONE    PROCEDURE:  MRI of the abdomen was performed.  MRCP was performed.    FINDINGS:  LOWER CHEST: Within normal limits.    LIVER: Within normal limits.  BILE DUCTS: Intrahepatic biliary ductal dilatation with upper limits of   normal caliber extrahepatic bile duct measuring 8 mm in diameter. No   obstructing lesion or filling defect identified.  GALLBLADDER: There is a 3.3 cm stone likely impacted within the   gallbladder neckand mild gallbladder wall thickening.  SPLEEN: Within normal limits.  PANCREAS: Within normal limits.  ADRENALS: Within normal limits.  KIDNEYS/URETERS: There are several simple bilateral renal cysts measuring   up to 2.4 cm within the right kidney upper pole (2-17).    VISUALIZED PORTIONS:  BOWEL: Within normal limits.  PERITONEUM: No ascites.  VESSELS: Within normal limits.  RETROPERITONEUM/LYMPH NODES: No lymphadenopathy.  ABDOMINAL WALL: Within normal limits.  BONES: Within normal limits.    IMPRESSION:  1. There is a 3.3 cm stone likely impacted within the gallbladder neck   and mild bladder wall thickening, suspicious for cholecystitis.  2. Intrahepatic biliary ductal dilatation with upper limits of normal   extra hepatic biliary duct caliber without evidence of   choledocholithiasis or obstructing lesion. This is of uncertain etiology   and significance.    --- End of Report ---            DENIZ SMALL MD; Attending Radiologist  This document has been electronically signed. Mar [id number]  6:41PM    < end of copied text >  < from: US Abdomen Upper Quadrant Right (03.14.25 @ 09:50) >    ACC: [id number] EXAM:  US ABDOMEN RT UPR QUADRANT   ORDERED BY: GAB COTTRELL     PROCEDURE DATE:  03/14/2025          INTERPRETATION:  CLINICAL INFORMATION: Abdominal pain. Elevated LFTs.   Evaluate biliary tree.    COMPARISON: MRCP 3/13/2025.    TECHNIQUE: Sonography of the right upper quadrant.    FINDINGS:  Liver: Partially obscured by overlying shadowing.  Bile ducts: Common bile duct measures 8 mm.  Gallbladder: Large nonmobile gallstone in the neck. Wall thickening   measuring 6 mm. Negative sonographic White sign.  Pancreas: Visualized portions are within normal limits.  Right kidney: 10.0 cm. No hydronephrosis. Cortical thinning with   increased echogenicity. Mid pole cyst measuring 2.3 cm.  Ascites: None.  IVC: Visualized portions are within normal limits.    IMPRESSION:  Mild prominence of the common bile duct.    Large nonmobile gallstone in the neck with gallbladder wall thickening.    --- End of Report ---            JOSE TILLEY MD; Attending Radiologist  This document has been electronically signed. Mar 14 2025 10:14AM    < end of copied text >

## 2025-03-14 NOTE — CONSULT NOTE ADULT - ASSESSMENT
Pt is a 79W w/ PMHx of COPD, HTN, HLD, glaucoma, hypothyroid p/w abd pain. Pt reports episodes of squeezing like pain for several days. Associated with nausea but no vomiting. pt points to her epigastric area when describing the pain but also reports feeling like a band is circling and squeezing her abdomen. outpatient US with GB wall thickening to 9mm, sent in by GI.     ID c/s for further evaluation, r/o acute cholecystitis    3/13 CT There is a 3.3 cm stone likely impacted within the gallbladder neck and mild bladder wall thickening, suspicious for cholecystitis.  3/13 MRCP 2 to 3 mm distal common duct stone with mild intra and extrahepatic biliary distention.  Large gallstone. Pericholecystic fluid concerning for acute cholecystitis.  3/14 RUQ Sono Mild prominence of the common bile duct. Large nonmobile gallstone in the neck with gallbladder wall thickening.    Afebrile, no leukocytosis. No evidence of acute cholecystitis on RUQ sono    Recommendations:  Hold Abx  Trend temps/WBC  Trend LFTs  Serial abdominal exams  GI c/s  Surgery following  Additional care per primary team    Patient evaluated with face-to-face time in addition to reviewing history, labs, microbiology, and imaging.   Antibiotic stewardship, local antibiogram, infection control strategies and potential transmission issues taken into consideration at time of treatment decision making process.   Thank you for allowing us to participate in the care of your patient.  Dr. Krueger covering weekend service from 3/15/25-3/16/25  I will resume care 3/17/25  Infectious Diseases will follow. Please call with any questions.  Kathy Ortiz M.D.  Available on [employer] TEAMS -- *[facility]*  [facility] [phone]  For after 5 P.M. and weekends, please call [phone]       Pt is a 79W w/ PMHx of COPD, HTN, HLD, glaucoma, hypothyroid p/w abd pain. Pt reports episodes of squeezing like pain for several days. Associated with nausea but no vomiting. pt points to her epigastric area when describing the pain but also reports feeling like a band is circling and squeezing her abdomen. outpatient US with GB wall thickening to 9mm, sent in by GI.     ID c/s for further evaluation, r/o acute cholecystitis    3/13 CT There is a 3.3 cm stone likely impacted within the gallbladder neck and mild bladder wall thickening, suspicious for cholecystitis.  3/13 MRCP 2 to 3 mm distal common duct stone with mild intra and extrahepatic biliary distention.  Large gallstone. Pericholecystic fluid concerning for acute cholecystitis.  3/14 RUQ Sono Mild prominence of the common bile duct. Large nonmobile gallstone in the neck with gallbladder wall thickening.    Afebrile, no leukocytosis. No evidence of acute cholecystitis on RUQ sono    Recommendations:  Monitor off Abx  Trend temps/WBC  Trend LFTs  Serial abdominal exams  GI c/s  Surgery following  Additional care per primary team    Patient evaluated with face-to-face time in addition to reviewing history, labs, microbiology, and imaging.   Antibiotic stewardship, local antibiogram, infection control strategies and potential transmission issues taken into consideration at time of treatment decision making process.   Thank you for allowing us to participate in the care of your patient.  D/w GI team  Dr. Krueger covering weekend service from 3/15/25-3/16/25  I will resume care 3/17/25  Infectious Diseases will follow. Please call with any questions.  Kathy Ortiz M.D.  Available on [employer] TEAMS -- *[facility]*  [facility] [phone]  For after 5 P.M. and weekends, please call [phone]

## 2025-03-14 NOTE — PATIENT PROFILE ADULT - NSPROPTRIGHTBILLOFRIGHTS_GEN_A_NUR
Reviewed x-ray results of possible fracture with patient's mother  Patient return to care now for splint placement  Referral placed Orthopedics  patient

## 2025-03-14 NOTE — CARE COORDINATION ASSESSMENT. - NSPASTMEDSURGHISTORY_GEN_ALL_CORE_FT
PAST MEDICAL & SURGICAL HISTORY:  Hypercholesteremia      COPD (chronic obstructive pulmonary disease)      Cold      Uterine cancer  2013 s/p total   hysterectomy      Glaucoma      Hypertension      Hypothyroidism      Parotid mass      History of tonsillectomy and adenoidectomy  1951      Cataract, left  2015      H/O dilation and curettage  2013, 1991      H/O total hysterectomy  2013      HLD (hyperlipidemia)      HTN (hypertension)      History of parathyroid surgery

## 2025-03-14 NOTE — CARE COORDINATION ASSESSMENT. - NSCAREPROVIDERS_GEN_ALL_CORE_FT
CARE PROVIDERS:  Accepting Physician: Paul Enriquez  Admitting: Paul Enriquez  Attending: Paul Enriquez  Case Management: Bob Mercado  Consultant: Flip Sainz  Consultant: Mina Venegas  Consultant: Julia Canchola  Consultant: Ashley Miller  Consultant: Laney Michele  Consultant: Kathy Ortiz  Consultant: Sheryl Pichardo  Consultant: Magdalena Garcia  Consultant: Derick Watkins  Covering Team: Rosa Elena Rooney  Covering Team: Hemant Urbina  ED ACP: Mauro Gruber  ED Attending: Devi Enciso  ED Nurse: Aniyah Cornell  Nurse: Janet Enriquez  Outpatient Provider: Rodri Weber  Outpatient Provider: Paul Enriquez  Override: Aniyah Cornell  Override: Lindsey Acosta  Registered Dietitian: Tomeka Silva  Research: Lauren De Jesus// Supp. Assoc.: Tiana Gruber

## 2025-03-14 NOTE — CONSULT NOTE ADULT - ASSESSMENT
80 yo female with history of HTN, HLD, COPD, hypothyroidism, and glaucoma who is presenting with abdominal pain and nausea. Admitted to r/o acute cholecystitis. Found with a 2 to 3mm distal CBD stone on CT imaging.     MRCP (3/13): Intrahepatic biliary ductal dilatation with upper limits of normal extra hepatic biliary duct caliber without evidence of choledocholithiasis or obstructing lesion.    RUQ US (3/14): Large nonmobile gallstone in the neck with gallbladder wall thickening. Mild prominence of the common bile duct.    Plan:  - CT A/P, MRCP, and RUQ US noted & discussed with patient   - Given no choledocholithiasis and total bilirubin/liver enzymes downtrending, likely passed CBD stone. No role for ERCP.   - Surgery team following, likely chronic cholecystitis and requires lower bilirubin prior to cholecystectomy   - ID following, observe off antibiotics for now   - Trend CMP daily   - No objection from GI standpoint to advance to clear liquids  - Pain management  - Anti-emetics PRN  - Monitor GI function   - To follow

## 2025-03-14 NOTE — CONSULT NOTE ADULT - SUBJECTIVE AND OBJECTIVE BOX
[facility]    Chaim Tyson NP    [address] [place] [address] [phone]      Chief Complaint:  Patient is a 79y old  Female who presents with a chief complaint of acute cholecystitis       HPI:  Patient is a 79-year-old female with past medical history of hypertension hyperlipidemia COPD glaucoma hypothyroidism advised to come to ED by her GI Dr. Cuba for acute cholecystitis.  Patient states she is been having abdominal pain for about a week and a half with associated nausea but no vomiting denies any fever chills chest pain shortness of breath feels like she has a band around her stomach which is worse after she eats.  Patient currently states she does not have any pain.  Patient had an outpatient ultrasound study and received a call to negative ordered emergency room and Dr. Venegas was contacted by her GI.     Interval HPI:  Patient seen and examined at bedside  Discussed and confirmed history above.   She reports pain started over a week ago. Pain is located RUQ, radiating all across the abdomen/back/left shoulder, and described as dull. At home pain was 10/10 and on my exam denies pain. Overall pain has been improving. Unknown aggravating factors. Alleviated with morphine. Associated with nausea but no vomiting. Also noticed "neon yellow/green" urine. No fevers or chills. She reports similar pain few years ago but did not have formal GI workup. Having normal BMs. No recent travel or sick contacts.     She follows primary GI Dr. Anna Joy, had colonoscopy many years ago which was reportedly normal.       PAST MEDICAL & SURGICAL HISTORY:  Parotid mass      Hypothyroidism      Hypertension      Glaucoma      Uterine cancer  2013 s/p total   hysterectomy      Cold      COPD (chronic obstructive pulmonary disease)      Hypercholesteremia      HTN (hypertension)      HLD (hyperlipidemia)      H/O total hysterectomy  2013      H/O dilation and curettage  2013, 1991      Cataract, left  2015      History of tonsillectomy and adenoidectomy  1951      History of parathyroid surgery          REVIEW OF SYSTEMS:   General: Negative  HEENT: Negative  CV: Negative  Respiratory: Negative  GI: See HPI  : Negative  MSK: Negative  Hematologic: Negative  Skin: Negative    MEDICATIONS:   MEDICATIONS  (STANDING):  fluticasone propionate/ salmeterol 250-50 MICROgram(s) Diskus 1 Dose(s) Inhalation two times a day  lactated ringers. 1000 milliLiter(s) (75 mL/Hr) IV Continuous <Continuous>  latanoprost 0.005% Ophthalmic Solution 1 Drop(s) Left EYE at bedtime  levothyroxine 75 MICROGram(s) Oral daily  potassium chloride  10 mEq/100 mL IVPB 10 milliEquivalent(s) IV Intermittent every 1 hour  timolol 0.5% Solution 1 Drop(s) Left EYE daily  verapamil  milliGRAM(s) Oral daily    MEDICATIONS  (PRN):  acetaminophen     Tablet .. 650 milliGRAM(s) Oral every 6 hours PRN Temp greater or equal to 38C (100.4F), Mild Pain (1 - 3)  ALPRAZolam 0.25 milliGRAM(s) Oral at bedtime PRN for insomnia  aluminum hydroxide/magnesium hydroxide/simethicone Suspension 30 milliLiter(s) Oral every 4 hours PRN Dyspepsia  melatonin 3 milliGRAM(s) Oral at bedtime PRN Insomnia  morphine  - Injectable 2 milliGRAM(s) IV Push every 4 hours PRN Severe Pain (7 - 10)  ondansetron Injectable 4 milliGRAM(s) IV Push every 8 hours PRN Nausea and/or Vomiting          DIET:  Diet, Clear Liquid (03-13-25 @ 19:02) [Active]          ALLERGIES:   Allergies    No Known Drug Allergies  Seafood (Other)    Intolerances    adhesives (Other)      VITAL SIGNS:   Vital Signs Last 24 Hrs  T(C): 36.7 (14 Mar 2025 06:25), Max: 36.8 (13 Mar 2025 22:31)  T(F): 98.1 (14 Mar 2025 06:25), Max: 98.3 (13 Mar 2025 22:31)  HR: 88 (14 Mar 2025 06:25) (88 - 106)  BP: 137/75 (14 Mar 2025 06:25) (137/75 - 157/86)  BP(mean): 102 (13 Mar 2025 21:01) (102 - 102)  RR: 17 (14 Mar 2025 06:25) (16 - 18)  SpO2: 93% (14 Mar 2025 06:25) (92% - 93%)    Parameters below as of 14 Mar 2025 06:25  Patient On (Oxygen Delivery Method): room air      I&O's Summary    13 Mar 2025 07:01  -  14 Mar 2025 07:00  --------------------------------------------------------  IN: 375 mL / OUT: 0 mL / NET: 375 mL        PHYSICAL EXAM:   GENERAL:  No acute distress  HEENT:  NC/AT  CHEST:  No increased effort  HEART:  Regular rate  ABDOMEN:  Soft, +mild TTDP RUQ, non-distended  EXTREMITIES: No cyanosis  SKIN:  Warm, dry  NEURO:  Calm, cooperative    LABS:                        13.6   6.61  )-----------( 176      ( 14 Mar 2025 04:30 )             39.0     Hemoglobin: 13.6 g/dL (03-14-25 @ 04:30)  Hemoglobin: 14.6 g/dL (03-13-25 @ 15:07)    03-14    140  |  108  |  6[L]  ----------------------------<  90  3.1[L]   |  28  |  0.81    Ca    8.4[L]      14 Mar 2025 04:30    TPro  5.9[L]  /  Alb  2.8[L]  /  TBili  4.2[H]  /  DBili  x   /  AST  479[H]  /  ALT  539[H]  /  AlkPhos  419[H]  03-14    LIVER FUNCTIONS - ( 14 Mar 2025 04:30 )  Alb: 2.8 g/dL / Pro: 5.9 g/dL / ALK PHOS: 419 U/L / ALT: 539 U/L / AST: 479 U/L / GGT: x             PT/INR - ( 13 Mar 2025 15:07 )   PT: 11.8 sec;   INR: 1.01 ratio         PTT - ( 13 Mar 2025 15:07 )  PTT:29.5 sec    Lipase: 17 U/L (03-13-25 @ 15:07)                                    RADIOLOGY & ADDITIONAL STUDIES:      ACC: [id number] EXAM:  MR MRCP   ORDERED BY:  ANGY MORALES     PROCEDURE DATE:  03/13/2025          INTERPRETATION:  CLINICAL INFORMATION: Elevated total bilirubin, rule out   acute cholecystitis.    COMPARISON: CT abdomen and pelvis performed earlier today.    CONTRAST/COMPLICATIONS:  IV Contrast: NONE  Oral Contrast: NONE    PROCEDURE:  MRI of the abdomen was performed.  MRCP was performed.    FINDINGS:  LOWER CHEST: Within normal limits.    LIVER: Within normal limits.  BILE DUCTS: Intrahepatic biliary ductal dilatation with upper limits of   normal caliber extrahepatic bile duct measuring 8 mm in diameter. No   obstructing lesion or filling defect identified.  GALLBLADDER: There is a 3.3 cm stone likely impacted within the   gallbladder neckand mild gallbladder wall thickening.  SPLEEN: Within normal limits.  PANCREAS: Within normal limits.  ADRENALS: Within normal limits.  KIDNEYS/URETERS: There are several simple bilateral renal cysts measuring   up to 2.4 cm within the right kidney upper pole (2-17).    VISUALIZED PORTIONS:  BOWEL: Within normal limits.  PERITONEUM: No ascites.  VESSELS: Within normal limits.  RETROPERITONEUM/LYMPH NODES: No lymphadenopathy.  ABDOMINAL WALL: Within normal limits.  BONES: Within normal limits.    IMPRESSION:  1. There is a 3.3 cm stone likely impacted within the gallbladder neck   and mild bladder wall thickening, suspicious for cholecystitis.  2. Intrahepatic biliary ductal dilatation with upper limits of normal   extra hepatic biliary duct caliber without evidence of   choledocholithiasis or obstructing lesion. This is of uncertain etiology   and significance.    --- End of Report ---            DENIZ SMALL MD; Attending Radiologist  This document has been electronically signed. Mar [id number]  6:41PM  03-13-25 @ 17:55    ACC: [id number] EXAM:  CT ABDOMEN AND PELVIS IC   ORDERED BY: MARIA VICTORIA BARAJAS     PROCEDURE DATE:  03/13/2025          INTERPRETATION:  CLINICAL INFORMATION: 79 years  Female with abd pain.    COMPARISON: None.    CONTRAST/COMPLICATIONS:  IV Contrast: Omnipaque 350  90 cc administered   10 cc discarded  Oral Contrast: NONE  .    PROCEDURE:  CT of the Abdomen and Pelvis was performed.  Sagittal and coronal reformats were performed.    FINDINGS:  LOWER CHEST: Mild bibasilar dependent atelectasis.    LIVER: Within normal limits.  BILE DUCTS: Mild intrahepatic biliary duct distention. Common duct 8 mm   the. 2 to 3 mm distal common duct stone at the level of the ampulla   (301:45).  GALLBLADDER: 2.2 cm calcified gallstone in the gallbladder neck.   Pericholecystic fat stranding and fluid concerning for acute   cholecystitis.  SPLEEN: Within normal limits.  PANCREAS: Within normal limits.  ADRENALS: Within normal limits.  KIDNEYS/URETERS: Bilateral cysts and hypodensities too small to   characterize. No hydroureteronephrosis.    BLADDER: Within normal limits.  REPRODUCTIVE ORGANS: Hysterectomy.    BOWEL: No bowel obstruction. Appendix is not visualized. No evidence of   inflammation in the pericecal region.. Moderate colonic stool burden.  PERITONEUM/RETROPERITONEUM: Within normal limits.  VESSELS: Atherosclerotic changes.  LYMPH NODES: No lymphadenopathy.  ABDOMINAL WALL: Small fat-containing umbilical hernia.  BONES: Degenerative changes. Grade 1 L4-5 anterolisthesis.    IMPRESSION:  2 to 3 mm distal common duct stone with mild intra and extrahepatic   biliary distention.    Large gallstone. Pericholecystic fluid concerning for acute cholecystitis.    Findings were discussed with KRISTY Gruber 3/13/2025 4:30 PM by Dr. Sea Villa with read back confirmation.    --- End of Report ---            SEA VILLA MD; Attending Radiologist  This document has been electronically signed. Mar 13 2025  4:33PM  03-13-25 @ 16:17    -- -- --   ACC: [id number] EXAM:  US ABDOMEN RT UPR QUADRANT   ORDERED BY: GAB COTTRELL     PROCEDURE DATE:  03/14/2025          INTERPRETATION:  CLINICAL INFORMATION: Abdominal pain. Elevated LFTs.   Evaluate biliary tree.    COMPARISON: MRCP 3/13/2025.    TECHNIQUE: Sonography of the right upper quadrant.    FINDINGS:  Liver: Partially obscured by overlying shadowing.  Bile ducts: Common bile duct measures 8 mm.  Gallbladder: Large nonmobile gallstone in the neck. Wall thickening   measuring 6 mm. Negative sonographic White sign.  Pancreas: Visualized portions are within normal limits.  Right kidney: 10.0 cm. No hydronephrosis. Cortical thinning with   increased echogenicity. Mid pole cyst measuring 2.3 cm.  Ascites: None.  IVC: Visualized portions are within normal limits.    IMPRESSION:  Mild prominence of the common bile duct.    Large nonmobile gallstone in the neck with gallbladder wall thickening.    --- End of Report ---            JOSE TILLEY MD; Attending Radiologist  This document has been electronically signed. Mar 14 2025 10:14AM

## 2025-03-15 LAB
ALBUMIN SERPL ELPH-MCNC: 2.8 G/DL — LOW (ref 3.3–5)
ALP SERPL-CCNC: 358 U/L — HIGH (ref 40–120)
ALT FLD-CCNC: 401 U/L — HIGH (ref 12–78)
ANION GAP SERPL CALC-SCNC: 7 MMOL/L — SIGNIFICANT CHANGE UP (ref 5–17)
AST SERPL-CCNC: 223 U/L — HIGH (ref 15–37)
BASOPHILS # BLD AUTO: 0.05 K/UL — SIGNIFICANT CHANGE UP (ref 0–0.2)
BASOPHILS NFR BLD AUTO: 0.9 % — SIGNIFICANT CHANGE UP (ref 0–2)
BILIRUB SERPL-MCNC: 1.4 MG/DL — HIGH (ref 0.2–1.2)
BUN SERPL-MCNC: 6 MG/DL — LOW (ref 7–23)
CALCIUM SERPL-MCNC: 8.7 MG/DL — SIGNIFICANT CHANGE UP (ref 8.5–10.1)
CHLORIDE SERPL-SCNC: 108 MMOL/L — SIGNIFICANT CHANGE UP (ref 96–108)
CO2 SERPL-SCNC: 26 MMOL/L — SIGNIFICANT CHANGE UP (ref 22–31)
CREAT SERPL-MCNC: 0.74 MG/DL — SIGNIFICANT CHANGE UP (ref 0.5–1.3)
EGFR: 82 ML/MIN/1.73M2 — SIGNIFICANT CHANGE UP
EGFR: 82 ML/MIN/1.73M2 — SIGNIFICANT CHANGE UP
EOSINOPHIL # BLD AUTO: 0.23 K/UL — SIGNIFICANT CHANGE UP (ref 0–0.5)
EOSINOPHIL NFR BLD AUTO: 4 % — SIGNIFICANT CHANGE UP (ref 0–6)
HCT VFR BLD CALC: 39.9 % — SIGNIFICANT CHANGE UP (ref 34.5–45)
HGB BLD-MCNC: 13.6 G/DL — SIGNIFICANT CHANGE UP (ref 11.5–15.5)
IMM GRANULOCYTES NFR BLD AUTO: 0.7 % — SIGNIFICANT CHANGE UP (ref 0–0.9)
LYMPHOCYTES # BLD AUTO: 1.17 K/UL — SIGNIFICANT CHANGE UP (ref 1–3.3)
LYMPHOCYTES # BLD AUTO: 20.5 % — SIGNIFICANT CHANGE UP (ref 13–44)
MAGNESIUM SERPL-MCNC: 1.9 MG/DL — SIGNIFICANT CHANGE UP (ref 1.6–2.6)
MCHC RBC-ENTMCNC: 31.3 PG — SIGNIFICANT CHANGE UP (ref 27–34)
MCHC RBC-ENTMCNC: 34.1 G/DL — SIGNIFICANT CHANGE UP (ref 32–36)
MCV RBC AUTO: 91.9 FL — SIGNIFICANT CHANGE UP (ref 80–100)
MONOCYTES # BLD AUTO: 0.41 K/UL — SIGNIFICANT CHANGE UP (ref 0–0.9)
MONOCYTES NFR BLD AUTO: 7.2 % — SIGNIFICANT CHANGE UP (ref 2–14)
NEUTROPHILS # BLD AUTO: 3.81 K/UL — SIGNIFICANT CHANGE UP (ref 1.8–7.4)
NEUTROPHILS NFR BLD AUTO: 66.7 % — SIGNIFICANT CHANGE UP (ref 43–77)
NRBC BLD AUTO-RTO: 0 /100 WBCS — SIGNIFICANT CHANGE UP (ref 0–0)
PHOSPHATE SERPL-MCNC: 2.1 MG/DL — LOW (ref 2.5–4.5)
PLATELET # BLD AUTO: 198 K/UL — SIGNIFICANT CHANGE UP (ref 150–400)
POTASSIUM SERPL-MCNC: 3.7 MMOL/L — SIGNIFICANT CHANGE UP (ref 3.5–5.3)
POTASSIUM SERPL-SCNC: 3.7 MMOL/L — SIGNIFICANT CHANGE UP (ref 3.5–5.3)
PROT SERPL-MCNC: 6.2 G/DL — SIGNIFICANT CHANGE UP (ref 6–8.3)
RBC # BLD: 4.34 M/UL — SIGNIFICANT CHANGE UP (ref 3.8–5.2)
SODIUM SERPL-SCNC: 141 MMOL/L — SIGNIFICANT CHANGE UP (ref 135–145)
WBC # BLD: 5.71 K/UL — SIGNIFICANT CHANGE UP (ref 3.8–10.5)
WBC # FLD AUTO: 5.71 K/UL — SIGNIFICANT CHANGE UP (ref 3.8–10.5)

## 2025-03-15 RX ADMIN — Medication 240 MILLIGRAM(S): at 05:26

## 2025-03-15 RX ADMIN — HEPARIN SODIUM 5000 UNIT(S): 1000 INJECTION INTRAVENOUS; SUBCUTANEOUS at 05:27

## 2025-03-15 RX ADMIN — HEPARIN SODIUM 5000 UNIT(S): 1000 INJECTION INTRAVENOUS; SUBCUTANEOUS at 21:49

## 2025-03-15 RX ADMIN — Medication 1 DOSE(S): at 20:00

## 2025-03-15 RX ADMIN — Medication 1 DOSE(S): at 07:05

## 2025-03-15 RX ADMIN — HEPARIN SODIUM 5000 UNIT(S): 1000 INJECTION INTRAVENOUS; SUBCUTANEOUS at 16:49

## 2025-03-15 RX ADMIN — LATANOPROST PF 1 DROP(S): 0.05 SOLUTION/ DROPS OPHTHALMIC at 21:50

## 2025-03-15 RX ADMIN — Medication 75 MICROGRAM(S): at 05:26

## 2025-03-15 RX ADMIN — TIMOLOL MALEATE 1 DROP(S): 6.8 SOLUTION OPHTHALMIC at 12:47

## 2025-03-16 LAB
ALBUMIN SERPL ELPH-MCNC: 3.1 G/DL — LOW (ref 3.3–5)
ALP SERPL-CCNC: 348 U/L — HIGH (ref 40–120)
ALT FLD-CCNC: 331 U/L — HIGH (ref 12–78)
ANION GAP SERPL CALC-SCNC: 9 MMOL/L — SIGNIFICANT CHANGE UP (ref 5–17)
APTT BLD: 34 SEC — SIGNIFICANT CHANGE UP (ref 24.5–35.6)
BASOPHILS # BLD AUTO: 0.06 K/UL — SIGNIFICANT CHANGE UP (ref 0–0.2)
BASOPHILS NFR BLD AUTO: 0.9 % — SIGNIFICANT CHANGE UP (ref 0–2)
BILIRUB SERPL-MCNC: 1.1 MG/DL — SIGNIFICANT CHANGE UP (ref 0.2–1.2)
BUN SERPL-MCNC: 6 MG/DL — LOW (ref 7–23)
CALCIUM SERPL-MCNC: 9.1 MG/DL — SIGNIFICANT CHANGE UP (ref 8.5–10.1)
CHLORIDE SERPL-SCNC: 105 MMOL/L — SIGNIFICANT CHANGE UP (ref 96–108)
CO2 SERPL-SCNC: 26 MMOL/L — SIGNIFICANT CHANGE UP (ref 22–31)
CREAT SERPL-MCNC: 0.82 MG/DL — SIGNIFICANT CHANGE UP (ref 0.5–1.3)
EGFR: 73 ML/MIN/1.73M2 — SIGNIFICANT CHANGE UP
EGFR: 73 ML/MIN/1.73M2 — SIGNIFICANT CHANGE UP
EOSINOPHIL # BLD AUTO: 0.24 K/UL — SIGNIFICANT CHANGE UP (ref 0–0.5)
EOSINOPHIL NFR BLD AUTO: 3.6 % — SIGNIFICANT CHANGE UP (ref 0–6)
GLUCOSE SERPL-MCNC: 96 MG/DL — SIGNIFICANT CHANGE UP (ref 70–99)
HCT VFR BLD CALC: 44.1 % — SIGNIFICANT CHANGE UP (ref 34.5–45)
HGB BLD-MCNC: 14.7 G/DL — SIGNIFICANT CHANGE UP (ref 11.5–15.5)
IMM GRANULOCYTES NFR BLD AUTO: 0.7 % — SIGNIFICANT CHANGE UP (ref 0–0.9)
INR BLD: 0.89 RATIO — SIGNIFICANT CHANGE UP (ref 0.85–1.16)
LYMPHOCYTES # BLD AUTO: 1.35 K/UL — SIGNIFICANT CHANGE UP (ref 1–3.3)
LYMPHOCYTES # BLD AUTO: 20 % — SIGNIFICANT CHANGE UP (ref 13–44)
MAGNESIUM SERPL-MCNC: 1.9 MG/DL — SIGNIFICANT CHANGE UP (ref 1.6–2.6)
MCHC RBC-ENTMCNC: 30.3 PG — SIGNIFICANT CHANGE UP (ref 27–34)
MCV RBC AUTO: 90.9 FL — SIGNIFICANT CHANGE UP (ref 80–100)
MONOCYTES # BLD AUTO: 0.39 K/UL — SIGNIFICANT CHANGE UP (ref 0–0.9)
MONOCYTES NFR BLD AUTO: 5.8 % — SIGNIFICANT CHANGE UP (ref 2–14)
NEUTROPHILS # BLD AUTO: 4.66 K/UL — SIGNIFICANT CHANGE UP (ref 1.8–7.4)
NRBC BLD AUTO-RTO: 0 /100 WBCS — SIGNIFICANT CHANGE UP (ref 0–0)
PHOSPHATE SERPL-MCNC: 2.4 MG/DL — LOW (ref 2.5–4.5)
PLATELET # BLD AUTO: 180 K/UL — SIGNIFICANT CHANGE UP (ref 150–400)
POTASSIUM SERPL-MCNC: 3.4 MMOL/L — LOW (ref 3.5–5.3)
POTASSIUM SERPL-SCNC: 3.4 MMOL/L — LOW (ref 3.5–5.3)
PROT SERPL-MCNC: 6.9 G/DL — SIGNIFICANT CHANGE UP (ref 6–8.3)
PROTHROM AB SERPL-ACNC: 10.5 SEC — SIGNIFICANT CHANGE UP (ref 9.9–13.4)
RBC # BLD: 4.85 M/UL — SIGNIFICANT CHANGE UP (ref 3.8–5.2)
SODIUM SERPL-SCNC: 140 MMOL/L — SIGNIFICANT CHANGE UP (ref 135–145)
WBC # BLD: 6.75 K/UL — SIGNIFICANT CHANGE UP (ref 3.8–10.5)
WBC # FLD AUTO: 6.75 K/UL — SIGNIFICANT CHANGE UP (ref 3.8–10.5)

## 2025-03-16 PROCEDURE — 99232 SBSQ HOSP IP/OBS MODERATE 35: CPT | Mod: 57

## 2025-03-16 RX ORDER — SODIUM CHLORIDE 9 G/1000ML
1000 INJECTION, SOLUTION INTRAVENOUS
Refills: 0 | Status: DISCONTINUED | OUTPATIENT
Start: 2025-03-16 | End: 2025-03-17

## 2025-03-16 RX ORDER — INDOCYANINE GREEN AND WATER 25 MG
5 KIT INJECTION ONCE
Refills: 0 | Status: COMPLETED | OUTPATIENT
Start: 2025-03-17 | End: 2025-03-17

## 2025-03-16 RX ADMIN — Medication 1 DOSE(S): at 06:12

## 2025-03-16 RX ADMIN — HEPARIN SODIUM 5000 UNIT(S): 1000 INJECTION INTRAVENOUS; SUBCUTANEOUS at 14:53

## 2025-03-16 RX ADMIN — TIMOLOL MALEATE 1 DROP(S): 6.8 SOLUTION OPHTHALMIC at 09:39

## 2025-03-16 RX ADMIN — Medication 75 MICROGRAM(S): at 06:13

## 2025-03-16 RX ADMIN — LATANOPROST PF 1 DROP(S): 0.05 SOLUTION/ DROPS OPHTHALMIC at 20:25

## 2025-03-16 RX ADMIN — HEPARIN SODIUM 5000 UNIT(S): 1000 INJECTION INTRAVENOUS; SUBCUTANEOUS at 06:13

## 2025-03-16 RX ADMIN — Medication 240 MILLIGRAM(S): at 06:14

## 2025-03-16 RX ADMIN — Medication 1 DOSE(S): at 20:25

## 2025-03-17 ENCOUNTER — TRANSCRIPTION ENCOUNTER (OUTPATIENT)
Age: 80
End: 2025-03-17

## 2025-03-17 VITALS
DIASTOLIC BLOOD PRESSURE: 75 MMHG | HEART RATE: 78 BPM | TEMPERATURE: 97 F | OXYGEN SATURATION: 92 % | SYSTOLIC BLOOD PRESSURE: 124 MMHG | RESPIRATION RATE: 18 BRPM

## 2025-03-17 LAB
ANION GAP SERPL CALC-SCNC: 6 MMOL/L — SIGNIFICANT CHANGE UP (ref 5–17)
BUN SERPL-MCNC: 5 MG/DL — LOW (ref 7–23)
CALCIUM SERPL-MCNC: 8.8 MG/DL — SIGNIFICANT CHANGE UP (ref 8.5–10.1)
CHLORIDE SERPL-SCNC: 107 MMOL/L — SIGNIFICANT CHANGE UP (ref 96–108)
CO2 SERPL-SCNC: 29 MMOL/L — SIGNIFICANT CHANGE UP (ref 22–31)
EGFR: 75 ML/MIN/1.73M2 — SIGNIFICANT CHANGE UP
EGFR: 75 ML/MIN/1.73M2 — SIGNIFICANT CHANGE UP
GLUCOSE SERPL-MCNC: 90 MG/DL — SIGNIFICANT CHANGE UP (ref 70–99)
HCT VFR BLD CALC: 39.6 % — SIGNIFICANT CHANGE UP (ref 34.5–45)
HGB BLD-MCNC: 13.4 G/DL — SIGNIFICANT CHANGE UP (ref 11.5–15.5)
MCHC RBC-ENTMCNC: 31.2 PG — SIGNIFICANT CHANGE UP (ref 27–34)
MCHC RBC-ENTMCNC: 33.8 G/DL — SIGNIFICANT CHANGE UP (ref 32–36)
MCV RBC AUTO: 92.3 FL — SIGNIFICANT CHANGE UP (ref 80–100)
NRBC BLD AUTO-RTO: 0 /100 WBCS — SIGNIFICANT CHANGE UP (ref 0–0)
PHOSPHATE SERPL-MCNC: 3.4 MG/DL — SIGNIFICANT CHANGE UP (ref 2.5–4.5)
PLATELET # BLD AUTO: 226 K/UL — SIGNIFICANT CHANGE UP (ref 150–400)
POTASSIUM SERPL-MCNC: 3.7 MMOL/L — SIGNIFICANT CHANGE UP (ref 3.5–5.3)
POTASSIUM SERPL-SCNC: 3.7 MMOL/L — SIGNIFICANT CHANGE UP (ref 3.5–5.3)
RBC # BLD: 4.29 M/UL — SIGNIFICANT CHANGE UP (ref 3.8–5.2)
RBC # FLD: 13.7 % — SIGNIFICANT CHANGE UP (ref 10.3–14.5)
SODIUM SERPL-SCNC: 142 MMOL/L — SIGNIFICANT CHANGE UP (ref 135–145)
WBC # BLD: 6.66 K/UL — SIGNIFICANT CHANGE UP (ref 3.8–10.5)
WBC # FLD AUTO: 6.66 K/UL — SIGNIFICANT CHANGE UP (ref 3.8–10.5)

## 2025-03-17 PROCEDURE — 94640 AIRWAY INHALATION TREATMENT: CPT

## 2025-03-17 PROCEDURE — 80053 COMPREHEN METABOLIC PANEL: CPT

## 2025-03-17 PROCEDURE — 86900 BLOOD TYPING SEROLOGIC ABO: CPT

## 2025-03-17 PROCEDURE — 86901 BLOOD TYPING SEROLOGIC RH(D): CPT

## 2025-03-17 PROCEDURE — 85027 COMPLETE CBC AUTOMATED: CPT

## 2025-03-17 PROCEDURE — 84100 ASSAY OF PHOSPHORUS: CPT

## 2025-03-17 PROCEDURE — 74177 CT ABD & PELVIS W/CONTRAST: CPT | Mod: MC

## 2025-03-17 PROCEDURE — 76705 ECHO EXAM OF ABDOMEN: CPT

## 2025-03-17 PROCEDURE — 93005 ELECTROCARDIOGRAM TRACING: CPT

## 2025-03-17 PROCEDURE — 88304 TISSUE EXAM BY PATHOLOGIST: CPT

## 2025-03-17 PROCEDURE — 36415 COLL VENOUS BLD VENIPUNCTURE: CPT

## 2025-03-17 PROCEDURE — 80048 BASIC METABOLIC PNL TOTAL CA: CPT

## 2025-03-17 PROCEDURE — 83735 ASSAY OF MAGNESIUM: CPT

## 2025-03-17 PROCEDURE — C1889: CPT

## 2025-03-17 PROCEDURE — 85730 THROMBOPLASTIN TIME PARTIAL: CPT

## 2025-03-17 PROCEDURE — C9399: CPT

## 2025-03-17 PROCEDURE — 47562 LAPAROSCOPIC CHOLECYSTECTOMY: CPT | Mod: AS

## 2025-03-17 PROCEDURE — 85025 COMPLETE CBC W/AUTO DIFF WBC: CPT

## 2025-03-17 PROCEDURE — 99285 EMERGENCY DEPT VISIT HI MDM: CPT | Mod: 25

## 2025-03-17 PROCEDURE — 85610 PROTHROMBIN TIME: CPT

## 2025-03-17 PROCEDURE — 88304 TISSUE EXAM BY PATHOLOGIST: CPT | Mod: 26

## 2025-03-17 PROCEDURE — 86850 RBC ANTIBODY SCREEN: CPT

## 2025-03-17 PROCEDURE — 83690 ASSAY OF LIPASE: CPT

## 2025-03-17 PROCEDURE — S2900: CPT

## 2025-03-17 PROCEDURE — 71045 X-RAY EXAM CHEST 1 VIEW: CPT

## 2025-03-17 PROCEDURE — 74181 MRI ABDOMEN W/O CONTRAST: CPT | Mod: MC

## 2025-03-17 DEVICE — LIGATING CLIPS WECK HEMOLOK POLYMER MEDIUM-LARGE (GREEN) 6: Type: IMPLANTABLE DEVICE | Status: FUNCTIONAL

## 2025-03-17 DEVICE — LIGATING CLIPS WECK HEMOLOK POLYMER LARGE (PURPLE) 6: Type: IMPLANTABLE DEVICE | Status: FUNCTIONAL

## 2025-03-17 RX ORDER — IBUPROFEN 200 MG
600 TABLET ORAL EVERY 6 HOURS
Refills: 0 | Status: DISCONTINUED | OUTPATIENT
Start: 2025-03-17 | End: 2025-03-17

## 2025-03-17 RX ORDER — HYDROMORPHONE/SOD CHLOR,ISO/PF 2 MG/10 ML
0.5 SYRINGE (ML) INJECTION
Refills: 0 | Status: DISCONTINUED | OUTPATIENT
Start: 2025-03-17 | End: 2025-03-17

## 2025-03-17 RX ORDER — LEVOTHYROXINE SODIUM 300 MCG
75 TABLET ORAL DAILY
Refills: 0 | Status: DISCONTINUED | OUTPATIENT
Start: 2025-03-17 | End: 2025-03-17

## 2025-03-17 RX ORDER — LATANOPROST PF 0.05 MG/ML
1 SOLUTION/ DROPS OPHTHALMIC AT BEDTIME
Refills: 0 | Status: DISCONTINUED | OUTPATIENT
Start: 2025-03-17 | End: 2025-03-17

## 2025-03-17 RX ORDER — IBUPROFEN 200 MG
1 TABLET ORAL
Qty: 12 | Refills: 0
Start: 2025-03-17

## 2025-03-17 RX ORDER — ALPRAZOLAM 0.5 MG
0.25 TABLET, EXTENDED RELEASE 24 HR ORAL AT BEDTIME
Refills: 0 | Status: DISCONTINUED | OUTPATIENT
Start: 2025-03-17 | End: 2025-03-17

## 2025-03-17 RX ORDER — HEPARIN SODIUM 1000 [USP'U]/ML
5000 INJECTION INTRAVENOUS; SUBCUTANEOUS EVERY 8 HOURS
Refills: 0 | Status: DISCONTINUED | OUTPATIENT
Start: 2025-03-17 | End: 2025-03-17

## 2025-03-17 RX ORDER — CLOTRIMAZOLE 1 G/100G
1 CREAM TOPICAL
Qty: 1 | Refills: 0
Start: 2025-03-17

## 2025-03-17 RX ORDER — ASPIRIN 325 MG
81 TABLET ORAL DAILY
Refills: 0 | Status: DISCONTINUED | OUTPATIENT
Start: 2025-03-17 | End: 2025-03-17

## 2025-03-17 RX ORDER — OXYCODONE HYDROCHLORIDE 30 MG/1
1 TABLET ORAL
Qty: 5 | Refills: 0
Start: 2025-03-17

## 2025-03-17 RX ORDER — ONDANSETRON HCL/PF 4 MG/2 ML
4 VIAL (ML) INJECTION EVERY 8 HOURS
Refills: 0 | Status: DISCONTINUED | OUTPATIENT
Start: 2025-03-17 | End: 2025-03-17

## 2025-03-17 RX ORDER — NYSTATIN 100000 [USP'U]/G
1 CREAM TOPICAL
Qty: 1 | Refills: 0
Start: 2025-03-17

## 2025-03-17 RX ORDER — MAGNESIUM, ALUMINUM HYDROXIDE 200-200 MG
30 TABLET,CHEWABLE ORAL EVERY 4 HOURS
Refills: 0 | Status: DISCONTINUED | OUTPATIENT
Start: 2025-03-17 | End: 2025-03-17

## 2025-03-17 RX ORDER — MELATONIN 5 MG
3 TABLET ORAL AT BEDTIME
Refills: 0 | Status: DISCONTINUED | OUTPATIENT
Start: 2025-03-17 | End: 2025-03-17

## 2025-03-17 RX ORDER — CLOTRIMAZOLE 1 G/100G
1 CREAM TOPICAL
Refills: 0 | Status: DISCONTINUED | OUTPATIENT
Start: 2025-03-17 | End: 2025-03-17

## 2025-03-17 RX ORDER — SODIUM CHLORIDE 9 G/1000ML
1000 INJECTION, SOLUTION INTRAVENOUS
Refills: 0 | Status: DISCONTINUED | OUTPATIENT
Start: 2025-03-17 | End: 2025-03-17

## 2025-03-17 RX ORDER — ACETAMINOPHEN 500 MG/5ML
650 LIQUID (ML) ORAL EVERY 6 HOURS
Refills: 0 | Status: DISCONTINUED | OUTPATIENT
Start: 2025-03-17 | End: 2025-03-17

## 2025-03-17 RX ORDER — CEFAZOLIN SODIUM IN 0.9 % NACL 3 G/100 ML
2000 INTRAVENOUS SOLUTION, PIGGYBACK (ML) INTRAVENOUS ONCE
Refills: 0 | Status: COMPLETED | OUTPATIENT
Start: 2025-03-17 | End: 2025-03-17

## 2025-03-17 RX ORDER — ONDANSETRON HCL/PF 4 MG/2 ML
4 VIAL (ML) INJECTION ONCE
Refills: 0 | Status: DISCONTINUED | OUTPATIENT
Start: 2025-03-17 | End: 2025-03-17

## 2025-03-17 RX ORDER — TIMOLOL MALEATE 6.8 MG/ML
1 SOLUTION OPHTHALMIC DAILY
Refills: 0 | Status: DISCONTINUED | OUTPATIENT
Start: 2025-03-17 | End: 2025-03-17

## 2025-03-17 RX ADMIN — HEPARIN SODIUM 5000 UNIT(S): 1000 INJECTION INTRAVENOUS; SUBCUTANEOUS at 15:11

## 2025-03-17 RX ADMIN — TIMOLOL MALEATE 1 DROP(S): 6.8 SOLUTION OPHTHALMIC at 05:21

## 2025-03-17 RX ADMIN — SODIUM CHLORIDE 75 MILLILITER(S): 9 INJECTION, SOLUTION INTRAVENOUS at 10:04

## 2025-03-17 RX ADMIN — INDOCYANINE GREEN AND WATER 5 MILLIGRAM(S): KIT at 08:46

## 2025-03-17 RX ADMIN — Medication 75 MICROGRAM(S): at 05:15

## 2025-03-17 RX ADMIN — Medication 1 DOSE(S): at 05:19

## 2025-03-17 RX ADMIN — Medication 240 MILLIGRAM(S): at 05:16

## 2025-03-17 NOTE — CHART NOTE - NSCHARTNOTEFT_GEN_A_CORE
IV ICG administered to patient at 3/17/25 08:35 in anticipation of robot-assisted lap melo this morning. Patient tolerated it without adverse effects.
Pt out of room at CT scan, spoke to her son at bedside. Discussed that pt was sent in to ED by her outpatient GI doctor for elevated LFTs and bilirubin. Abdominal pain has been intermittent for about 1.5 weeks    Plan:  - Follow up CT  - If CT not conclusive for CBD stone, pt will need MRCP w/wo IV contrast  - Pain control  - IVF  - Will need admission  - D/w son and ER PA  - Full consult note to follow tomorrow

## 2025-03-17 NOTE — PROGRESS NOTE ADULT - NSPROGADDITIONALINFOA_GEN_ALL_CORE
I reviewed the overnight course of events on the unit, re-confirming the patient history. I discussed the care with the patient and their family  The plan of care was discussed with the nurse practitioner and modifications were made to the notation where appropriate.   Differential diagnosis and plan of care discussed with patient after the evaluation  35 minutes spent on total encounter of which more than fifty percent of the encounter was spent counseling and/or coordinating care by the attending physician.  Advanced care planning was discussed with patient and family.  Advanced care planning forms were reviewed and discussed.  Risks, benefits and alternatives of gastroenterologic procedures were discussed in detail and all questions were answered.
agree with above unless contradicts below  pt MRCP negative for stone  likely chronic cholecystitis  want bilirubin lower prior to cholecystectomy  clear liquids for now
pt with stone in neck and likely passed stone  pt likely with recurrent symptoms  pt for robotic cholecystectomy  surgery d/w pt including r/b/a

## 2025-03-17 NOTE — PROGRESS NOTE ADULT - SUBJECTIVE AND OBJECTIVE BOX
[facility]  EUGEINA De La Rosa Y. Patel, S. Shah, [facility]  [phone]    Name: JACOB GUAJARDO  Age: 79y  Gender: Female  MRN: [id number]    Interval History:  No acute overnight events.   Reporting yeast infection under breast fold  Notes reviewed    Antibiotics:  ceFAZolin   IVPB 2000 milliGRAM(s) IV Intermittent once      Medications:  acetaminophen     Tablet .. 650 milliGRAM(s) Oral every 6 hours PRN  ALPRAZolam 0.25 milliGRAM(s) Oral at bedtime PRN  aluminum hydroxide/magnesium hydroxide/simethicone Suspension 30 milliLiter(s) Oral every 4 hours PRN  ceFAZolin   IVPB 2000 milliGRAM(s) IV Intermittent once  clotrimazole 1% Cream 1 Application(s) Topical two times a day  fluticasone propionate/ salmeterol 250-50 MICROgram(s) Diskus 1 Dose(s) Inhalation two times a day  lactated ringers. 1000 milliLiter(s) IV Continuous <Continuous>  latanoprost 0.005% Ophthalmic Solution 1 Drop(s) Left EYE at bedtime  levothyroxine 75 MICROGram(s) Oral daily  melatonin 3 milliGRAM(s) Oral at bedtime PRN  morphine  - Injectable 2 milliGRAM(s) IV Push every 4 hours PRN  ondansetron Injectable 4 milliGRAM(s) IV Push every 8 hours PRN  timolol 0.5% Solution 1 Drop(s) Left EYE daily  verapamil  milliGRAM(s) Oral daily      Review of Systems:  A 10-point review of systems was obtained.   Review of systems otherwise negative except as previously noted.    Allergies: No Known Drug Allergies  Seafood (Other)    For details regarding the patient's past medical history, social history, family history, and other miscellaneous elements, please refer the initial infectious diseases consultation and/or the admitting history and physical examination for this admission.    Objective:  Vitals:   T(C): 36.7 (03-17-25 @ 04:55), Max: 36.8 (03-16-25 @ 22:39)  HR: 84 (03-17-25 @ 04:55) (70 - 84)  BP: 124/68 (03-17-25 @ 04:55) (124/68 - 142/80)  RR: 18 (03-17-25 @ 04:55) (16 - 18)  SpO2: 93% (03-17-25 @ 04:55) (91% - 95%)    Physical Examination:  General: no acute distress  HEENT: NC/AT, EOMI  Cardio:RRR  Resp: breath sounds heard bilaterally  Abd: soft, NT, ND  Ext: no edema or cyanosis  Skin: warm, dry, no visible rash      Laboratory Studies:  CBC:                       13.4   6.66  )-----------( 226      ( 17 Mar 2025 06:36 )             39.6     CMP: 03-17    142  |  107  |  5[L]  ----------------------------<  90  3.7   |  29  |  0.80    Ca    8.8      17 Mar 2025 06:36  Phos  3.4     03-17  Mg     1.9     03-16    TPro  6.9  /  Alb  3.1[L]  /  TBili  1.1  /  DBili  x   /  AST  130[H]  /  ALT  331[H]  /  AlkPhos  348[H]  03-16    LIVER FUNCTIONS - ( 16 Mar 2025 08:10 )  Alb: 3.1 g/dL / Pro: 6.9 g/dL / ALK PHOS: 348 U/L / ALT: 331 U/L / AST: 130 U/L / GGT: x           Urinalysis Basic - ( 17 Mar 2025 06:36 )    Color: x / Appearance: x / SG: x / pH: x  Gluc: 90 mg/dL / Ketone: x  / Bili: x / Urobili: x   Blood: x / Protein: x / Nitrite: x   Leuk Esterase: x / RBC: x / WBC x   Sq Epi: x / Non Sq Epi: x / Bacteria: x        Microbiology: reviewed        Radiology: reviewed      
[facility]    Chaim Tyson NP    [address] [place] [id number] [phone]      Chief Complaint:  Patient is a 79y old  Female who presents with a chief complaint of acute cholecystitis       INTERVAL HPI/ OVERNIGHT EVENTS:  pt seen and examined, reports feeling better  denies having abdominal pain  tolerating diet no n/v reported  +BM yesterday, LFTs noted downtrending          PAST MEDICAL & SURGICAL HISTORY:  Parotid mass      Hypothyroidism      Hypertension      Glaucoma      Uterine cancer  2013 s/p total   hysterectomy      Cold      COPD (chronic obstructive pulmonary disease)      Hypercholesteremia      HTN (hypertension)      HLD (hyperlipidemia)      H/O total hysterectomy  2013      H/O dilation and curettage  2013, 1991      Cataract, left  2015      History of tonsillectomy and adenoidectomy  1951      History of parathyroid surgery          REVIEW OF SYSTEMS:   General: Negative  HEENT: Negative  CV: Negative  Respiratory: Negative  GI: See HPI  : Negative  MSK: Negative  Hematologic: Negative  Skin: Negative    MEDICATIONS:   MEDICATIONS  (STANDING):  fluticasone propionate/ salmeterol 250-50 MICROgram(s) Diskus 1 Dose(s) Inhalation two times a day  heparin   Injectable 5000 Unit(s) SubCutaneous every 8 hours  latanoprost 0.005% Ophthalmic Solution 1 Drop(s) Left EYE at bedtime  levothyroxine 75 MICROGram(s) Oral daily  timolol 0.5% Solution 1 Drop(s) Left EYE daily  verapamil  milliGRAM(s) Oral daily    MEDICATIONS  (PRN):  acetaminophen     Tablet .. 650 milliGRAM(s) Oral every 6 hours PRN Temp greater or equal to 38C (100.4F), Mild Pain (1 - 3)  ALPRAZolam 0.25 milliGRAM(s) Oral at bedtime PRN for insomnia  aluminum hydroxide/magnesium hydroxide/simethicone Suspension 30 milliLiter(s) Oral every 4 hours PRN Dyspepsia  melatonin 3 milliGRAM(s) Oral at bedtime PRN Insomnia  morphine  - Injectable 2 milliGRAM(s) IV Push every 4 hours PRN Severe Pain (7 - 10)  ondansetron Injectable 4 milliGRAM(s) IV Push every 8 hours PRN Nausea and/or Vomiting                DIET:  Diet, Clear Liquid (03-13-25 @ 19:02) [Active]          ALLERGIES:   Allergies    No Known Drug Allergies  Seafood (Other)    Intolerances    adhesives (Other)      VITAL SIGNS:   Vital Signs Last 24 Hrs  T(C): 36.7 (15 Mar 2025 05:21), Max: 37 (14 Mar 2025 20:52)  T(F): 98.1 (15 Mar 2025 05:21), Max: 98.6 (14 Mar 2025 20:52)  HR: 83 (15 Mar 2025 05:21) (80 - 88)  BP: 145/72 (15 Mar 2025 05:21) (130/72 - 145/72)  BP(mean): --  RR: 18 (15 Mar 2025 05:21) (17 - 18)  SpO2: 91% (15 Mar 2025 05:21) (91% - 94%)    Parameters below as of 15 Mar 2025 05:21  Patient On (Oxygen Delivery Method): room air    I&O's Summary    13 Mar 2025 07:01  -  14 Mar 2025 07:00  --------------------------------------------------------  IN: 375 mL / OUT: 0 mL / NET: 375 mL        PHYSICAL EXAM:   GENERAL:  No acute distress  HEENT:  NC/AT  CHEST:  No increased effort  HEART:  Regular rate  ABDOMEN:  Soft, +mild TTDP RUQ, non-distended  EXTREMITIES: No cyanosis  SKIN:  Warm, dry  NEURO:  Calm, cooperative    LABS:                                 14.7   6.75  )-----------( 180      ( 16 Mar 2025 08:10 )             44.1                    Hemoglobin: 13.6 g/dL (03-14-25 @ 04:30)  Hemoglobin: 14.6 g/dL (03-13-25 @ 15:07)        03-16    140  |  105  |  6[L]  ----------------------------<  96  3.4[L]   |  26  |  0.82    Ca    9.1      16 Mar 2025 08:10  Phos  2.4     03-16  Mg     1.9     03-16    TPro  6.9  /  Alb  3.1[L]  /  TBili  1.1  /  DBili  x   /  AST  130[H]  /  ALT  331[H]  /  AlkPhos  348[H]  03-16    LIVER FUNCTIONS - ( 16 Mar 2025 08:10 )  Alb: 3.1 g/dL / Pro: 6.9 g/dL / ALK PHOS: 348 U/L / ALT: 331 U/L / AST: 130 U/L / GGT: x             PT/INR - ( 13 Mar 2025 15:07 )   PT: 11.8 sec;   INR: 1.01 ratio         PTT - ( 13 Mar 2025 15:07 )  PTT:29.5 sec    Lipase: 17 U/L (03-13-25 @ 15:07)                                    RADIOLOGY & ADDITIONAL STUDIES:      ACC: [id number] EXAM:  MR MRCP   ORDERED BY:  ANGY MORALES     PROCEDURE DATE:  03/13/2025          INTERPRETATION:  CLINICAL INFORMATION: Elevated total bilirubin, rule out   acute cholecystitis.    COMPARISON: CT abdomen and pelvis performed earlier today.    CONTRAST/COMPLICATIONS:  IV Contrast: NONE  Oral Contrast: NONE    PROCEDURE:  MRI of the abdomen was performed.  MRCP was performed.    FINDINGS:  LOWER CHEST: Within normal limits.    LIVER: Within normal limits.  BILE DUCTS: Intrahepatic biliary ductal dilatation with upper limits of   normal caliber extrahepatic bile duct measuring 8 mm in diameter. No   obstructing lesion or filling defect identified.  GALLBLADDER: There is a 3.3 cm stone likely impacted within the   gallbladder neckand mild gallbladder wall thickening.  SPLEEN: Within normal limits.  PANCREAS: Within normal limits.  ADRENALS: Within normal limits.  KIDNEYS/URETERS: There are several simple bilateral renal cysts measuring   up to 2.4 cm within the right kidney upper pole (2-17).    VISUALIZED PORTIONS:  BOWEL: Within normal limits.  PERITONEUM: No ascites.  VESSELS: Within normal limits.  RETROPERITONEUM/LYMPH NODES: No lymphadenopathy.  ABDOMINAL WALL: Within normal limits.  BONES: Within normal limits.    IMPRESSION:  1. There is a 3.3 cm stone likely impacted within the gallbladder neck   and mild bladder wall thickening, suspicious for cholecystitis.  2. Intrahepatic biliary ductal dilatation with upper limits of normal   extra hepatic biliary duct caliber without evidence of   choledocholithiasis or obstructing lesion. This is of uncertain etiology   and significance.    --- End of Report ---            DENIZ SMALL MD; Attending Radiologist  This document has been electronically signed. Mar [id number]  6:41PM  03-13-25 @ 17:55    ACC: [id number] EXAM:  CT ABDOMEN AND PELVIS IC   ORDERED BY: MARIA VICTORIA BARAJAS     PROCEDURE DATE:  03/13/2025          INTERPRETATION:  CLINICAL INFORMATION: 79 years  Female with abd pain.    COMPARISON: None.    CONTRAST/COMPLICATIONS:  IV Contrast: Omnipaque 350  90 cc administered   10 cc discarded  Oral Contrast: NONE  .    PROCEDURE:  CT of the Abdomen and Pelvis was performed.  Sagittal and coronal reformats were performed.    FINDINGS:  LOWER CHEST: Mild bibasilar dependent atelectasis.    LIVER: Within normal limits.  BILE DUCTS: Mild intrahepatic biliary duct distention. Common duct 8 mm   the. 2 to 3 mm distal common duct stone at the level of the ampulla   (301:45).  GALLBLADDER: 2.2 cm calcified gallstone in the gallbladder neck.   Pericholecystic fat stranding and fluid concerning for acute   cholecystitis.  SPLEEN: Within normal limits.  PANCREAS: Within normal limits.  ADRENALS: Within normal limits.  KIDNEYS/URETERS: Bilateral cysts and hypodensities too small to   characterize. No hydroureteronephrosis.    BLADDER: Within normal limits.  REPRODUCTIVE ORGANS: Hysterectomy.    BOWEL: No bowel obstruction. Appendix is not visualized. No evidence of   inflammation in the pericecal region.. Moderate colonic stool burden.  PERITONEUM/RETROPERITONEUM: Within normal limits.  VESSELS: Atherosclerotic changes.  LYMPH NODES: No lymphadenopathy.  ABDOMINAL WALL: Small fat-containing umbilical hernia.  BONES: Degenerative changes. Grade 1 L4-5 anterolisthesis.    IMPRESSION:  2 to 3 mm distal common duct stone with mild intra and extrahepatic   biliary distention.    Large gallstone. Pericholecystic fluid concerning for acute cholecystitis.    Findings were discussed with KRISTY Gruber 3/13/2025 4:30 PM by Dr. Sea Villa with read back confirmation.    --- End of Report ---            SEA VILLA MD; Attending Radiologist  This document has been electronically signed. Mar 13 2025  4:33PM  03-13-25 @ 16:17    -- -- --   ACC: [id number] EXAM:  US ABDOMEN RT UPR QUADRANT   ORDERED BY: GAB COTTRELL     PROCEDURE DATE:  03/14/2025          INTERPRETATION:  CLINICAL INFORMATION: Abdominal pain. Elevated LFTs.   Evaluate biliary tree.    COMPARISON: MRCP 3/13/2025.    TECHNIQUE: Sonography of the right upper quadrant.    FINDINGS:  Liver: Partially obscured by overlying shadowing.  Bile ducts: Common bile duct measures 8 mm.  Gallbladder: Large nonmobile gallstone in the neck. Wall thickening   measuring 6 mm. Negative sonographic White sign.  Pancreas: Visualized portions are within normal limits.  Right kidney: 10.0 cm. No hydronephrosis. Cortical thinning with   increased echogenicity. Mid pole cyst measuring 2.3 cm.  Ascites: None.  IVC: Visualized portions are within normal limits.    IMPRESSION:  Mild prominence of the common bile duct.    Large nonmobile gallstone in the neck with gallbladder wall thickening.    --- End of Report ---            JOSE TILLEY MD; Attending Radiologist  This document has been electronically signed. Mar 14 2025 10:14AM    
PA POST-OP NOTE    s/p robot-assisted laparoscopic cholecystectomy  SUBJECTIVE:  Patient seen and examined at bedside, alert, resting comfortably, in no acute distress.  Patient reports some generalized abdominal soreness, pain is well controlled.  Patient denies nausea, vomiting, diarrhea, shortness of breath, dizziness.    OBJECTIVE:  Vital signs stable, afebrile.  I/Os: lactated ringers @75 ml/h    Physical Exam:  Gen: alert, NAD  Lungs: unlabored and equal chest expansion  CVS: pulse regular  ABD: soft, nondistended, appropriate incisional tenderness, incisions - clean, dry, intact. Dermabond present  EXT: no edema or calf tenderness bilaterally. SCDs present. Hands with equal and normal sensation, cap refill, ROM b/l. Skin without redness or swelling.     A/P: POD#0, s/p robot-assisted laparoscopic cholecystectomy    - Continue current care  - Resume regular diet  - Discharge home today if patient tolerating pain and diet    Case and plan discussed with attending and nursing staff
Patient is a 79y Female admitted on 3/13 with acute cholecystitis. She has been admitted for medical optimization and pain management. Patient was seen and evaluated at bedside. They report their pain is well controlled and have no complaints. Patient denies headaches, fevers, chills, N/V, diarrhea, chest pain. Overall, patient is doing well.     T(C): 36.4 (03-16-25 @ 04:50), Max: 36.7 (03-15-25 @ 21:23)  HR: 77 (03-16-25 @ 04:50) (75 - 77)  BP: 155/76 (03-16-25 @ 04:50) (117/79 - 155/76)  RR: 18 (03-16-25 @ 04:50) (18 - 18)  SpO2: 92% (03-16-25 @ 04:50) (91% - 92%)  Wt(kg): --    PHYSICAL EXAM:  General: no acute distress, appears comfortable  HEENT: normocephalic, anicteric  Pulm: non labored respirations  Cardio: RRR  Abdomen: soft, nontender, nondistended, RUQ discomfort  Extremities: no calf tenderness noted    LABS:                        14.7   6.75  )-----------( 180      ( 16 Mar 2025 08:10 )             44.1     03-16    140  |  105  |  6[L]  ----------------------------<  96  3.4[L]   |  26  |  0.82    Ca    9.1      16 Mar 2025 08:10  Phos  2.4     03-16  Mg     1.9     03-16    TPro  6.9  /  Alb  3.1[L]  /  TBili  1.1  /  DBili  x   /  AST  130[H]  /  ALT  331[H]  /  AlkPhos  348[H]  03-16    PT/INR - ( 16 Mar 2025 08:10 )   PT: 10.5 sec;   INR: 0.89 ratio         PTT - ( 16 Mar 2025 08:10 )  PTT:34.0 sec  Urinalysis Basic - ( 16 Mar 2025 08:10 )    Color: x / Appearance: x / SG: x / pH: x  Gluc: 96 mg/dL / Ketone: x  / Bili: x / Urobili: x   Blood: x / Protein: x / Nitrite: x   Leuk Esterase: x / RBC: x / WBC x   Sq Epi: x / Non Sq Epi: x / Bacteria: x        RADIOLOGY & ADDITIONAL STUDIES:    Abdominal US:   IMPRESSION:  Mild prominence of the common bile duct.    Large nonmobile gallstone in the neck with gallbladder wall thickening.    MRCP:  IMPRESSION:  1. There is a 3.3 cm stone likely impacted within the gallbladder neck   and mild bladder wall thickening, suspicious for cholecystitis.  2. Intrahepatic biliary ductal dilatation with upper limits of normal   extra hepatic biliary duct caliber without evidence of   choledocholithiasis or obstructing lesion. This is of uncertain etiology   and significance.          MEDICATIONS:  acetaminophen     Tablet .. 650 milliGRAM(s) Oral every 6 hours PRN  ALPRAZolam 0.25 milliGRAM(s) Oral at bedtime PRN  aluminum hydroxide/magnesium hydroxide/simethicone Suspension 30 milliLiter(s) Oral every 4 hours PRN  fluticasone propionate/ salmeterol 250-50 MICROgram(s) Diskus 1 Dose(s) Inhalation two times a day  heparin   Injectable 5000 Unit(s) SubCutaneous every 8 hours  latanoprost 0.005% Ophthalmic Solution 1 Drop(s) Left EYE at bedtime  levothyroxine 75 MICROGram(s) Oral daily  melatonin 3 milliGRAM(s) Oral at bedtime PRN  morphine  - Injectable 2 milliGRAM(s) IV Push every 4 hours PRN  ondansetron Injectable 4 milliGRAM(s) IV Push every 8 hours PRN  timolol 0.5% Solution 1 Drop(s) Left EYE daily  verapamil  milliGRAM(s) Oral daily              80 y/o F admitted with acute cholecystitis     No leukocytosis appreciated.   LFTs elevated but down trending;  (223)  (401)  AVSS     - Plan for robotic assisted laparoscopy cholecystectomy on 03/16/25 with Dr. Venegas   - Continue FLD, then NPO after midnight   - Pain control as needed  - Encourage OOB/ambulation & incentive spirometry  - Monitor bowel function/serial abdominal exams  - Trend daily labs  - DVT Prophylaxis with SQH and SCDs  - Above to be discussed with Dr. Urbina     Surgical Team  Spectralink: [id number]
        Patient seen and examined bedside resting comfortably. No complaints offered.  pain is well controlled .Denies nausea and vomiting. Tolerating diet.. Denies chest pain, dyspnea, cough.    T(F): 97.2 (03-17-25 @ 13:45), Max: 98.2 (03-16-25 @ 22:39)  HR: 78 (03-17-25 @ 13:45) (68 - 84)  BP: 124/75 (03-17-25 @ 13:45) (117/81 - 154/90)  RR: 18 (03-17-25 @ 13:45) (10 - 19)  SpO2: 92% (03-17-25 @ 13:45) (91% - 100%)  Wt(kg): --  CAPILLARY BLOOD GLUCOSE          PHYSICAL EXAM:  General: NAD, WDWN.   Neuro:  Alert & oriented x 3  CV: +S1+S2 regular rate and rhythm  Lung:  respirations nonlabored, good inspiratory effort  Abdomen: soft, NT ND, + BS, wound clean and intact  Extremities: no pedal edema or calf tenderness noted       LABS:                        13.4   6.66  )-----------( 226      ( 17 Mar 2025 06:36 )             39.6     03-17    142  |  107  |  5[L]  ----------------------------<  90  3.7   |  29  |  0.80    Ca    8.8      17 Mar 2025 06:36  Phos  3.4     03-17  Mg     1.9     03-16    TPro  6.9  /  Alb  3.1[L]  /  TBili  1.1  /  DBili  x   /  AST  130[H]  /  ALT  331[H]  /  AlkPhos  348[H]  03-16    PT/INR - ( 16 Mar 2025 08:10 )   PT: 10.5 sec;   INR: 0.89 ratio         PTT - ( 16 Mar 2025 08:10 )  PTT:34.0 sec  I&O's Detail    16 Mar 2025 07:01  -  17 Mar 2025 07:00  --------------------------------------------------------  IN:    Lactated Ringers: 525 mL  Total IN: 525 mL    OUT:  Total OUT: 0 mL    Total NET: 525 mL      17 Mar 2025 07:01  -  17 Mar 2025 17:17  --------------------------------------------------------  IN:    Lactated Ringers: 150 mL    Oral Fluid: 120 mL  Total IN: 270 mL    OUT:  Total OUT: 0 mL    Total NET: 270 mL          Impression: 79y Female admitted with Calculus of bile duct without obstruction, cholangitis, or cholecystitis      PMH Parotid mass    Hypothyroidism    Hypertension    Glaucoma    Uterine cancer    Cold    COPD (chronic obstructive pulmonary disease)    Hypercholesteremia    HTN (hypertension)    HLD (hyperlipidemia)        Plan:  discharge and follow up
[facility]    Chaim Tyson NP    [address] [place] [address] [phone]      Chief Complaint:  Patient is a 79y old  Female who presents with a chief complaint of acute cholecystitis       INTERVAL HPI/ OVERNIGHT EVENTS:  pt seen and examined, reports feeling better  denies having abdominal pain  tolerating diet no n/v reported  +BM, LFTs noted downtrending          PAST MEDICAL & SURGICAL HISTORY:  Parotid mass      Hypothyroidism      Hypertension      Glaucoma      Uterine cancer  2013 s/p total   hysterectomy      Cold      COPD (chronic obstructive pulmonary disease)      Hypercholesteremia      HTN (hypertension)      HLD (hyperlipidemia)      H/O total hysterectomy  2013      H/O dilation and curettage  2013, 1991      Cataract, left  2015      History of tonsillectomy and adenoidectomy  1951      History of parathyroid surgery          REVIEW OF SYSTEMS:   General: Negative  HEENT: Negative  CV: Negative  Respiratory: Negative  GI: See HPI  : Negative  MSK: Negative  Hematologic: Negative  Skin: Negative    MEDICATIONS:   MEDICATIONS  (STANDING):  fluticasone propionate/ salmeterol 250-50 MICROgram(s) Diskus 1 Dose(s) Inhalation two times a day  heparin   Injectable 5000 Unit(s) SubCutaneous every 8 hours  latanoprost 0.005% Ophthalmic Solution 1 Drop(s) Left EYE at bedtime  levothyroxine 75 MICROGram(s) Oral daily  timolol 0.5% Solution 1 Drop(s) Left EYE daily  verapamil  milliGRAM(s) Oral daily    MEDICATIONS  (PRN):  acetaminophen     Tablet .. 650 milliGRAM(s) Oral every 6 hours PRN Temp greater or equal to 38C (100.4F), Mild Pain (1 - 3)  ALPRAZolam 0.25 milliGRAM(s) Oral at bedtime PRN for insomnia  aluminum hydroxide/magnesium hydroxide/simethicone Suspension 30 milliLiter(s) Oral every 4 hours PRN Dyspepsia  melatonin 3 milliGRAM(s) Oral at bedtime PRN Insomnia  morphine  - Injectable 2 milliGRAM(s) IV Push every 4 hours PRN Severe Pain (7 - 10)  ondansetron Injectable 4 milliGRAM(s) IV Push every 8 hours PRN Nausea and/or Vomiting                DIET:  Diet, Clear Liquid (03-13-25 @ 19:02) [Active]          ALLERGIES:   Allergies    No Known Drug Allergies  Seafood (Other)    Intolerances    adhesives (Other)      VITAL SIGNS:   Vital Signs Last 24 Hrs  T(C): 36.7 (15 Mar 2025 05:21), Max: 37 (14 Mar 2025 20:52)  T(F): 98.1 (15 Mar 2025 05:21), Max: 98.6 (14 Mar 2025 20:52)  HR: 83 (15 Mar 2025 05:21) (80 - 88)  BP: 145/72 (15 Mar 2025 05:21) (130/72 - 145/72)  BP(mean): --  RR: 18 (15 Mar 2025 05:21) (17 - 18)  SpO2: 91% (15 Mar 2025 05:21) (91% - 94%)    Parameters below as of 15 Mar 2025 05:21  Patient On (Oxygen Delivery Method): room air    I&O's Summary    13 Mar 2025 07:01  -  14 Mar 2025 07:00  --------------------------------------------------------  IN: 375 mL / OUT: 0 mL / NET: 375 mL        PHYSICAL EXAM:   GENERAL:  No acute distress  HEENT:  NC/AT  CHEST:  No increased effort  HEART:  Regular rate  ABDOMEN:  Soft, +mild TTDP RUQ, non-distended  EXTREMITIES: No cyanosis  SKIN:  Warm, dry  NEURO:  Calm, cooperative    LABS:                        13.6   5.71  )-----------( 198      ( 15 Mar 2025 08:06 )             39.9                    Hemoglobin: 13.6 g/dL (03-14-25 @ 04:30)  Hemoglobin: 14.6 g/dL (03-13-25 @ 15:07)      03-15    141  |  108  |  6[L]  ----------------------------<  81  3.7   |  26  |  0.74    Ca    8.7      15 Mar 2025 08:06  Phos  2.1     03-15  Mg     1.9     03-15    TPro  6.2  /  Alb  2.8[L]  /  TBili  1.4[H]  /  DBili  x   /  AST  223[H]  /  ALT  401[H]  /  AlkPhos  358[H]  03-15      LIVER FUNCTIONS - ( 15 Mar 2025 08:06 )  Alb: 2.8 g/dL / Pro: 6.2 g/dL / ALK PHOS: 358 U/L / ALT: 401 U/L / AST: 223 U/L / GGT: x                 PT/INR - ( 13 Mar 2025 15:07 )   PT: 11.8 sec;   INR: 1.01 ratio         PTT - ( 13 Mar 2025 15:07 )  PTT:29.5 sec    Lipase: 17 U/L (03-13-25 @ 15:07)                                    RADIOLOGY & ADDITIONAL STUDIES:      ACC: [id number] EXAM:  MR MRCP   ORDERED BY:  ANGY MORALES     PROCEDURE DATE:  03/13/2025          INTERPRETATION:  CLINICAL INFORMATION: Elevated total bilirubin, rule out   acute cholecystitis.    COMPARISON: CT abdomen and pelvis performed earlier today.    CONTRAST/COMPLICATIONS:  IV Contrast: NONE  Oral Contrast: NONE    PROCEDURE:  MRI of the abdomen was performed.  MRCP was performed.    FINDINGS:  LOWER CHEST: Within normal limits.    LIVER: Within normal limits.  BILE DUCTS: Intrahepatic biliary ductal dilatation with upper limits of   normal caliber extrahepatic bile duct measuring 8 mm in diameter. No   obstructing lesion or filling defect identified.  GALLBLADDER: There is a 3.3 cm stone likely impacted within the   gallbladder neckand mild gallbladder wall thickening.  SPLEEN: Within normal limits.  PANCREAS: Within normal limits.  ADRENALS: Within normal limits.  KIDNEYS/URETERS: There are several simple bilateral renal cysts measuring   up to 2.4 cm within the right kidney upper pole (2-17).    VISUALIZED PORTIONS:  BOWEL: Within normal limits.  PERITONEUM: No ascites.  VESSELS: Within normal limits.  RETROPERITONEUM/LYMPH NODES: No lymphadenopathy.  ABDOMINAL WALL: Within normal limits.  BONES: Within normal limits.    IMPRESSION:  1. There is a 3.3 cm stone likely impacted within the gallbladder neck   and mild bladder wall thickening, suspicious for cholecystitis.  2. Intrahepatic biliary ductal dilatation with upper limits of normal   extra hepatic biliary duct caliber without evidence of   choledocholithiasis or obstructing lesion. This is of uncertain etiology   and significance.    --- End of Report ---            DENIZ SMALL MD; Attending Radiologist  This document has been electronically signed. Mar [id number]  6:41PM  03-13-25 @ 17:55    ACC: [id number] EXAM:  CT ABDOMEN AND PELVIS IC   ORDERED BY: MARIA VICTORIA BARAJAS     PROCEDURE DATE:  03/13/2025          INTERPRETATION:  CLINICAL INFORMATION: 79 years  Female with abd pain.    COMPARISON: None.    CONTRAST/COMPLICATIONS:  IV Contrast: Omnipaque 350  90 cc administered   10 cc discarded  Oral Contrast: NONE  .    PROCEDURE:  CT of the Abdomen and Pelvis was performed.  Sagittal and coronal reformats were performed.    FINDINGS:  LOWER CHEST: Mild bibasilar dependent atelectasis.    LIVER: Within normal limits.  BILE DUCTS: Mild intrahepatic biliary duct distention. Common duct 8 mm   the. 2 to 3 mm distal common duct stone at the level of the ampulla   (301:45).  GALLBLADDER: 2.2 cm calcified gallstone in the gallbladder neck.   Pericholecystic fat stranding and fluid concerning for acute   cholecystitis.  SPLEEN: Within normal limits.  PANCREAS: Within normal limits.  ADRENALS: Within normal limits.  KIDNEYS/URETERS: Bilateral cysts and hypodensities too small to   characterize. No hydroureteronephrosis.    BLADDER: Within normal limits.  REPRODUCTIVE ORGANS: Hysterectomy.    BOWEL: No bowel obstruction. Appendix is not visualized. No evidence of   inflammation in the pericecal region.. Moderate colonic stool burden.  PERITONEUM/RETROPERITONEUM: Within normal limits.  VESSELS: Atherosclerotic changes.  LYMPH NODES: No lymphadenopathy.  ABDOMINAL WALL: Small fat-containing umbilical hernia.  BONES: Degenerative changes. Grade 1 L4-5 anterolisthesis.    IMPRESSION:  2 to 3 mm distal common duct stone with mild intra and extrahepatic   biliary distention.    Large gallstone. Pericholecystic fluid concerning for acute cholecystitis.    Findings were discussed with KRISTY Gruber 3/13/2025 4:30 PM by Dr. Sea Villa with read back confirmation.    --- End of Report ---            SEA VILLA MD; Attending Radiologist  This document has been electronically signed. Mar 13 2025  4:33PM  03-13-25 @ 16:17    -- -- --   ACC: [id number] EXAM:  US ABDOMEN RT UPR QUADRANT   ORDERED BY: GAB COTTRELL     PROCEDURE DATE:  03/14/2025          INTERPRETATION:  CLINICAL INFORMATION: Abdominal pain. Elevated LFTs.   Evaluate biliary tree.    COMPARISON: MRCP 3/13/2025.    TECHNIQUE: Sonography of the right upper quadrant.    FINDINGS:  Liver: Partially obscured by overlying shadowing.  Bile ducts: Common bile duct measures 8 mm.  Gallbladder: Large nonmobile gallstone in the neck. Wall thickening   measuring 6 mm. Negative sonographic White sign.  Pancreas: Visualized portions are within normal limits.  Right kidney: 10.0 cm. No hydronephrosis. Cortical thinning with   increased echogenicity. Mid pole cyst measuring 2.3 cm.  Ascites: None.  IVC: Visualized portions are within normal limits.    IMPRESSION:  Mild prominence of the common bile duct.    Large nonmobile gallstone in the neck with gallbladder wall thickening.    --- End of Report ---            JOSE TILLEY MD; Attending Radiologist  This document has been electronically signed. Mar 14 2025 10:14AM    
Patient is a 79y old  Female who presents with a chief complaint of Acute Cholecystitis (16 Mar 2025 09:56)        INTERVAL HPI/OVERNIGHT EVENTS:   no complaints  pt seen and examined         Vital Signs Last 24 Hrs  T(C): 36.6 (16 Mar 2025 11:06), Max: 36.7 (15 Mar 2025 21:23)  T(F): 97.8 (16 Mar 2025 11:06), Max: 98.1 (15 Mar 2025 21:23)  HR: 70 (16 Mar 2025 11:06) (70 - 77)  BP: 126/82 (16 Mar 2025 11:06) (126/82 - 155/76)  BP(mean): --  RR: 18 (16 Mar 2025 11:06) (18 - 18)  SpO2: 93% (16 Mar 2025 11:06) (91% - 93%)    Parameters below as of 16 Mar 2025 11:06  Patient On (Oxygen Delivery Method): room air        acetaminophen     Tablet .. 650 milliGRAM(s) Oral every 6 hours PRN  ALPRAZolam 0.25 milliGRAM(s) Oral at bedtime PRN  aluminum hydroxide/magnesium hydroxide/simethicone Suspension 30 milliLiter(s) Oral every 4 hours PRN  fluticasone propionate/ salmeterol 250-50 MICROgram(s) Diskus 1 Dose(s) Inhalation two times a day  heparin   Injectable 5000 Unit(s) SubCutaneous every 8 hours  latanoprost 0.005% Ophthalmic Solution 1 Drop(s) Left EYE at bedtime  levothyroxine 75 MICROGram(s) Oral daily  melatonin 3 milliGRAM(s) Oral at bedtime PRN  morphine  - Injectable 2 milliGRAM(s) IV Push every 4 hours PRN  ondansetron Injectable 4 milliGRAM(s) IV Push every 8 hours PRN  timolol 0.5% Solution 1 Drop(s) Left EYE daily  verapamil  milliGRAM(s) Oral daily      PHYSICAL EXAM:  GENERAL: NAD   EYES: conjunctiva and sclera clear  ENMT: Moist mucous membranes  NECK: Supple, No JVD, Normal thyroid  CHEST/LUNG: non labored, cta b/l  HEART: Regular rate and rhythm; No murmurs, rubs, or gallops  ABDOMEN: Soft, Nontender, Nondistended; Bowel sounds present  EXTREMITIES:  No clubbing, no cyanosis, no edema  LYMPH: No lymphadenopathy noted  SKIN: No rashes or lesions  NEURO: no new focal deficits    Consultant(s) Notes Reviewed:  [x ] YES  [ ] NO  Care Discussed with Consultants/Other Providers [ x] YES  [ ] NO    LABS:                        14.7   6.75  )-----------( 180      ( 16 Mar 2025 08:10 )             44.1     03-16    140  |  105  |  6[L]  ----------------------------<  96  3.4[L]   |  26  |  0.82    Ca    9.1      16 Mar 2025 08:10  Phos  2.4     03-16  Mg     1.9     03-16    TPro  6.9  /  Alb  3.1[L]  /  TBili  1.1  /  DBili  x   /  AST  130[H]  /  ALT  331[H]  /  AlkPhos  348[H]  03-16    PT/INR - ( 16 Mar 2025 08:10 )   PT: 10.5 sec;   INR: 0.89 ratio         PTT - ( 16 Mar 2025 08:10 )  PTT:34.0 sec  Urinalysis Basic - ( 16 Mar 2025 08:10 )    Color: x / Appearance: x / SG: x / pH: x  Gluc: 96 mg/dL / Ketone: x  / Bili: x / Urobili: x   Blood: x / Protein: x / Nitrite: x   Leuk Esterase: x / RBC: x / WBC x   Sq Epi: x / Non Sq Epi: x / Bacteria: x      CAPILLARY BLOOD GLUCOSE            Urinalysis Basic - ( 16 Mar 2025 08:10 )    Color: x / Appearance: x / SG: x / pH: x  Gluc: 96 mg/dL / Ketone: x  / Bili: x / Urobili: x   Blood: x / Protein: x / Nitrite: x   Leuk Esterase: x / RBC: x / WBC x   Sq Epi: x / Non Sq Epi: x / Bacteria: x          RADIOLOGY & ADDITIONAL TESTS:    Imaging Personally Reviewed  Reviewed consultants input
SURGERY PA NOTE ON BEHALF OF DR. BONILLA:    S: Patient seen and examined at bedside.   No acute events overnight.  patient states no abdominal pain at this time.  Tolerating clear liquid diet.  Denies fevers, chills, chest pain, SOB, palpitations, calf pain.      MEDICATIONS:  acetaminophen     Tablet .. 650 milliGRAM(s) Oral every 6 hours PRN  ALPRAZolam 0.25 milliGRAM(s) Oral at bedtime PRN  aluminum hydroxide/magnesium hydroxide/simethicone Suspension 30 milliLiter(s) Oral every 4 hours PRN  fluticasone propionate/ salmeterol 250-50 MICROgram(s) Diskus 1 Dose(s) Inhalation two times a day  heparin   Injectable 5000 Unit(s) SubCutaneous every 8 hours  latanoprost 0.005% Ophthalmic Solution 1 Drop(s) Left EYE at bedtime  levothyroxine 75 MICROGram(s) Oral daily  melatonin 3 milliGRAM(s) Oral at bedtime PRN  morphine  - Injectable 2 milliGRAM(s) IV Push every 4 hours PRN  ondansetron Injectable 4 milliGRAM(s) IV Push every 8 hours PRN  timolol 0.5% Solution 1 Drop(s) Left EYE daily  verapamil  milliGRAM(s) Oral daily      O:  Vital Signs Last 24 Hrs  T(C): 36.7 (15 Mar 2025 05:21), Max: 37 (14 Mar 2025 20:52)  T(F): 98.1 (15 Mar 2025 05:21), Max: 98.6 (14 Mar 2025 20:52)  HR: 83 (15 Mar 2025 05:21) (80 - 88)  BP: 145/72 (15 Mar 2025 05:21) (130/72 - 145/72)  BP(mean): --  RR: 18 (15 Mar 2025 05:21) (17 - 18)  SpO2: 91% (15 Mar 2025 05:21) (91% - 94%)    Parameters below as of 15 Mar 2025 05:21  Patient On (Oxygen Delivery Method): room air        I&O SUMMARY:    03-13-25 @ 07:01  -  03-14-25 @ 07:00  --------------------------------------------------------  IN: 375 mL / OUT: 0 mL / NET: 375 mL        PHYSICAL EXAM:  Lungs: CTA bilat without W/R/R  Card: S1S2  Abd: Soft, NT, ND.  +BS x 4.  No rebound/guarding.    Ext: Calves soft, NT, without edema bilat    LABS:                        13.6   6.61  )-----------( 176      ( 14 Mar 2025 04:30 )             39.0     03-14    140  |  108  |  6[L]  ----------------------------<  90  3.1[L]   |  28  |  0.81    Ca    8.4[L]      14 Mar 2025 04:30    TPro  5.9[L]  /  Alb  2.8[L]  /  TBili  4.2[H]  /  DBili  x   /  AST  479[H]  /  ALT  539[H]  /  AlkPhos  419[H]  03-14    PT/INR - ( 13 Mar 2025 15:07 )   PT: 11.8 sec;   INR: 1.01 ratio         PTT - ( 13 Mar 2025 15:07 )  PTT:29.5 sec          RADIOLOGY:    Assessment:  79yFemale with PMHx hypertension, hyperlipidemia, COPD, R glaucoma, hypothyroidism presents to the ED after abdominal pain x1week and abnormal outpatient RUQ ultrasound results along with elevated LFTs and bilirubin. Patient sent to ED on behalf of GI doctor to be evaluated by Dr. Venegas. Abd CT significant for 2 to 3 mm distal common duct stone with mild intra and extrahepatic biliary distention. Large gallstone. Pericholecystic fluid concerning for acute cholecystitis.  Transaminitis slightly downtrending, Tbili 4.2 downtrending.        Plan:  - Cont Clear diet for now  - monitor symptoms  - f/u LFTs this AM  - on schedule for robotic lap melo monday 3/17  - to discuss w/ Dr. Bonilla      
Patient is a 79y old  Female who presents with a chief complaint of acute cholecystitis (13 Mar 2025 19:01)        INTERVAL HPI/OVERNIGHT EVENTS:   no complaints  pt seen and examined         Vital Signs Last 24 Hrs  T(C): 36.7 (15 Mar 2025 21:23), Max: 36.7 (15 Mar 2025 05:21)  T(F): 98.1 (15 Mar 2025 21:23), Max: 98.1 (15 Mar 2025 05:21)  HR: 77 (15 Mar 2025 21:23) (75 - 83)  BP: 147/78 (15 Mar 2025 21:23) (117/79 - 147/78)  BP(mean): --  RR: 18 (15 Mar 2025 21:23) (18 - 18)  SpO2: 91% (15 Mar 2025 21:23) (91% - 91%)    Parameters below as of 15 Mar 2025 21:23  Patient On (Oxygen Delivery Method): room air        acetaminophen     Tablet .. 650 milliGRAM(s) Oral every 6 hours PRN  ALPRAZolam 0.25 milliGRAM(s) Oral at bedtime PRN  aluminum hydroxide/magnesium hydroxide/simethicone Suspension 30 milliLiter(s) Oral every 4 hours PRN  fluticasone propionate/ salmeterol 250-50 MICROgram(s) Diskus 1 Dose(s) Inhalation two times a day  heparin   Injectable 5000 Unit(s) SubCutaneous every 8 hours  latanoprost 0.005% Ophthalmic Solution 1 Drop(s) Left EYE at bedtime  levothyroxine 75 MICROGram(s) Oral daily  melatonin 3 milliGRAM(s) Oral at bedtime PRN  morphine  - Injectable 2 milliGRAM(s) IV Push every 4 hours PRN  ondansetron Injectable 4 milliGRAM(s) IV Push every 8 hours PRN  timolol 0.5% Solution 1 Drop(s) Left EYE daily  verapamil  milliGRAM(s) Oral daily      PHYSICAL EXAM:  GENERAL: NAD   EYES: conjunctiva and sclera clear  ENMT: Moist mucous membranes  NECK: Supple, No JVD, Normal thyroid  CHEST/LUNG: non labored, cta b/l  HEART: Regular rate and rhythm; No murmurs, rubs, or gallops  ABDOMEN: Soft, Nontender, Nondistended; Bowel sounds present  EXTREMITIES:  No clubbing, no cyanosis, no edema  LYMPH: No lymphadenopathy noted  SKIN: No rashes or lesions  NEURO: no new focal deficits    Consultant(s) Notes Reviewed:  [x ] YES  [ ] NO  Care Discussed with Consultants/Other Providers [ x] YES  [ ] NO    LABS:                        13.6   5.71  )-----------( 198      ( 15 Mar 2025 08:06 )             39.9     03-15    141  |  108  |  6[L]  ----------------------------<  81  3.7   |  26  |  0.74    Ca    8.7      15 Mar 2025 08:06  Phos  2.1     03-15  Mg     1.9     03-15    TPro  6.2  /  Alb  2.8[L]  /  TBili  1.4[H]  /  DBili  x   /  AST  223[H]  /  ALT  401[H]  /  AlkPhos  358[H]  03-15      Urinalysis Basic - ( 15 Mar 2025 08:06 )    Color: x / Appearance: x / SG: x / pH: x  Gluc: 81 mg/dL / Ketone: x  / Bili: x / Urobili: x   Blood: x / Protein: x / Nitrite: x   Leuk Esterase: x / RBC: x / WBC x   Sq Epi: x / Non Sq Epi: x / Bacteria: x      CAPILLARY BLOOD GLUCOSE            Urinalysis Basic - ( 15 Mar 2025 08:06 )    Color: x / Appearance: x / SG: x / pH: x  Gluc: 81 mg/dL / Ketone: x  / Bili: x / Urobili: x   Blood: x / Protein: x / Nitrite: x   Leuk Esterase: x / RBC: x / WBC x   Sq Epi: x / Non Sq Epi: x / Bacteria: x          RADIOLOGY & ADDITIONAL TESTS:    Imaging Personally Reviewed  Reviewed consultants input
Patient is a 79y old  Female who presents with a chief complaint of acute cholecystitis (13 Mar 2025 19:01)        INTERVAL HPI/OVERNIGHT EVENTS:   no complaints  pt seen and examined         Vital Signs Last 24 Hrs  T(C): 36.8 (14 Mar 2025 13:02), Max: 36.8 (13 Mar 2025 22:31)  T(F): 98.2 (14 Mar 2025 13:02), Max: 98.3 (13 Mar 2025 22:31)  HR: 88 (14 Mar 2025 13:02) (88 - 99)  BP: 144/81 (14 Mar 2025 13:02) (137/75 - 147/80)  BP(mean): 102 (13 Mar 2025 21:01) (102 - 102)  RR: 18 (14 Mar 2025 13:02) (16 - 18)  SpO2: 93% (14 Mar 2025 13:02) (92% - 93%)    Parameters below as of 14 Mar 2025 13:02  Patient On (Oxygen Delivery Method): room air        acetaminophen     Tablet .. 650 milliGRAM(s) Oral every 6 hours PRN  ALPRAZolam 0.25 milliGRAM(s) Oral at bedtime PRN  aluminum hydroxide/magnesium hydroxide/simethicone Suspension 30 milliLiter(s) Oral every 4 hours PRN  fluticasone propionate/ salmeterol 250-50 MICROgram(s) Diskus 1 Dose(s) Inhalation two times a day  heparin   Injectable 5000 Unit(s) SubCutaneous every 8 hours  lactated ringers. 1000 milliLiter(s) IV Continuous <Continuous>  latanoprost 0.005% Ophthalmic Solution 1 Drop(s) Left EYE at bedtime  levothyroxine 75 MICROGram(s) Oral daily  melatonin 3 milliGRAM(s) Oral at bedtime PRN  morphine  - Injectable 2 milliGRAM(s) IV Push every 4 hours PRN  ondansetron Injectable 4 milliGRAM(s) IV Push every 8 hours PRN  potassium chloride    Tablet ER 40 milliEquivalent(s) Oral once  timolol 0.5% Solution 1 Drop(s) Left EYE daily  verapamil  milliGRAM(s) Oral daily      PHYSICAL EXAM:  GENERAL: NAD   EYES: conjunctiva and sclera clear  ENMT: Moist mucous membranes  NECK: Supple, No JVD, Normal thyroid  CHEST/LUNG: non labored, cta b/l  HEART: Regular rate and rhythm; No murmurs, rubs, or gallops  ABDOMEN: Soft, Nontender, Nondistended; Bowel sounds present  EXTREMITIES:  No clubbing, no cyanosis, no edema  LYMPH: No lymphadenopathy noted  SKIN: No rashes or lesions  NEURO: no new focal deficits    Consultant(s) Notes Reviewed:  [x ] YES  [ ] NO  Care Discussed with Consultants/Other Providers [ x] YES  [ ] NO    LABS:                        13.6   6.61  )-----------( 176      ( 14 Mar 2025 04:30 )             39.0     03-14    140  |  108  |  6[L]  ----------------------------<  90  3.1[L]   |  28  |  0.81    Ca    8.4[L]      14 Mar 2025 04:30    TPro  5.9[L]  /  Alb  2.8[L]  /  TBili  4.2[H]  /  DBili  x   /  AST  479[H]  /  ALT  539[H]  /  AlkPhos  419[H]  03-14    PT/INR - ( 13 Mar 2025 15:07 )   PT: 11.8 sec;   INR: 1.01 ratio         PTT - ( 13 Mar 2025 15:07 )  PTT:29.5 sec  Urinalysis Basic - ( 14 Mar 2025 04:30 )    Color: x / Appearance: x / SG: x / pH: x  Gluc: 90 mg/dL / Ketone: x  / Bili: x / Urobili: x   Blood: x / Protein: x / Nitrite: x   Leuk Esterase: x / RBC: x / WBC x   Sq Epi: x / Non Sq Epi: x / Bacteria: x      CAPILLARY BLOOD GLUCOSE            Urinalysis Basic - ( 14 Mar 2025 04:30 )    Color: x / Appearance: x / SG: x / pH: x  Gluc: 90 mg/dL / Ketone: x  / Bili: x / Urobili: x   Blood: x / Protein: x / Nitrite: x   Leuk Esterase: x / RBC: x / WBC x   Sq Epi: x / Non Sq Epi: x / Bacteria: x          RADIOLOGY & ADDITIONAL TESTS:    Imaging Personally Reviewed  Reviewed consultants input
SURGERY PA NOTE ON BEHALF OF DR. Venegas/   SURGERY:    S: Patient seen and examined at bedside.   Pt reports feeling well without c/o abd pain, N/V, CP, SOB, fever or chills. Remains NPO    MEDICATIONS:  acetaminophen     Tablet .. 650 milliGRAM(s) Oral every 6 hours PRN  ALPRAZolam 0.25 milliGRAM(s) Oral at bedtime PRN  aluminum hydroxide/magnesium hydroxide/simethicone Suspension 30 milliLiter(s) Oral every 4 hours PRN  fluticasone propionate/ salmeterol 250-50 MICROgram(s) Diskus 1 Dose(s) Inhalation two times a day  lactated ringers. 1000 milliLiter(s) IV Continuous <Continuous>  latanoprost 0.005% Ophthalmic Solution 1 Drop(s) Left EYE at bedtime  levothyroxine 75 MICROGram(s) Oral daily  melatonin 3 milliGRAM(s) Oral at bedtime PRN  morphine  - Injectable 2 milliGRAM(s) IV Push every 4 hours PRN  ondansetron Injectable 4 milliGRAM(s) IV Push every 8 hours PRN  potassium chloride  10 mEq/100 mL IVPB 10 milliEquivalent(s) IV Intermittent every 1 hour  timolol 0.5% Solution 1 Drop(s) Left EYE daily  verapamil  milliGRAM(s) Oral daily      O:  Vital Signs Last 24 Hrs  T(C): 36.7 (14 Mar 2025 06:25), Max: 36.8 (13 Mar 2025 22:31)  T(F): 98.1 (14 Mar 2025 06:25), Max: 98.3 (13 Mar 2025 22:31)  HR: 88 (14 Mar 2025 06:25) (88 - 106)  BP: 137/75 (14 Mar 2025 06:25) (137/75 - 157/86)  BP(mean): 102 (13 Mar 2025 21:01) (102 - 102)  RR: 17 (14 Mar 2025 06:25) (16 - 18)  SpO2: 93% (14 Mar 2025 06:25) (92% - 93%)    Parameters below as of 14 Mar 2025 06:25  Patient On (Oxygen Delivery Method): room air        I&O SUMMARY:    03-13-25 @ 07:01  -  03-14-25 @ 07:00  --------------------------------------------------------  IN: 375 mL / OUT: 0 mL / NET: 375 mL        PHYSICAL EXAM:  Lungs: CTA bilat    Card: S1S2  Abd: Soft, NT, ND. No rebound/guarding.    Ext: Calves soft, NT, with tr edema bilat    LABS:                        13.6   6.61  )-----------( 176      ( 14 Mar 2025 04:30 )             39.0     03-14    140  |  108  |  6[L]  ----------------------------<  90  3.1[L]   |  28  |  0.81    Ca    8.4[L]      14 Mar 2025 04:30    TPro  5.9[L]  /  Alb  2.8[L]  /  TBili  4.2[H]  /  DBili  x   /  AST  479[H]  /  ALT  539[H]  /  AlkPhos  419[H]  03-14    PT/INR - ( 13 Mar 2025 15:07 )   PT: 11.8 sec;   INR: 1.01 ratio         PTT - ( 13 Mar 2025 15:07 )  PTT:29.5 sec          RADIOLOGY:    < from: CT Abdomen and Pelvis w/ IV Cont (03.13.25 @ 16:17) >    ACC: [id number] EXAM:  CT ABDOMEN AND PELVIS IC   ORDERED BY: MARIA VICTORIA BARAJAS     PROCEDURE DATE:  03/13/2025          INTERPRETATION:  CLINICAL INFORMATION: 79 years  Female with abd pain.    COMPARISON: None.    CONTRAST/COMPLICATIONS:  IV Contrast: Omnipaque 350  90 cc administered   10 cc discarded  Oral Contrast: NONE  .    PROCEDURE:  CT of the Abdomen and Pelvis was performed.  Sagittal and coronal reformats were performed.    FINDINGS:  LOWER CHEST: Mild bibasilar dependent atelectasis.    LIVER: Within normal limits.  BILE DUCTS: Mild intrahepatic biliary duct distention. Common duct 8 mm   the. 2 to 3 mm distal common duct stone at the level of the ampulla   (301:45).  GALLBLADDER: 2.2 cm calcified gallstone in the gallbladder neck.   Pericholecystic fat stranding and fluid concerning for acute   cholecystitis.  SPLEEN: Within normal limits.  PANCREAS: Within normal limits.  ADRENALS: Within normal limits.  KIDNEYS/URETERS: Bilateral cysts and hypodensities too small to   characterize. No hydroureteronephrosis.    BLADDER: Within normal limits.  REPRODUCTIVE ORGANS: Hysterectomy.    BOWEL: No bowel obstruction. Appendix is not visualized. No evidence of   inflammation in the pericecal region.. Moderate colonic stool burden.  PERITONEUM/RETROPERITONEUM: Within normal limits.  VESSELS: Atherosclerotic changes.  LYMPH NODES: No lymphadenopathy.  ABDOMINAL WALL: Small fat-containing umbilical hernia.  BONES: Degenerative changes. Grade 1 L4-5 anterolisthesis.    IMPRESSION:  2 to 3 mm distal common duct stone with mild intra and extrahepatic   biliary distention.    Large gallstone. Pericholecystic fluid concerning for acute cholecystitis.    Findings were discussed with KRISTY Gruber 3/13/2025 4:30 PM by Dr. Arminda Holm with read back confirmation.    --- End of Report ---      < end of copied text >  < from: MR MRCP No Cont (03.13.25 @ 17:55) >    ACC: [id number] EXAM:  MR MRCP   ORDERED BY:  ANGY MORALES     PROCEDURE DATE:  03/13/2025          INTERPRETATION:  CLINICAL INFORMATION: Elevated total bilirubin, rule out   acute cholecystitis.    COMPARISON: CT abdomen and pelvis performed earlier today.    CONTRAST/COMPLICATIONS:  IV Contrast: NONE  Oral Contrast: NONE    PROCEDURE:  MRI of the abdomen was performed.  MRCP was performed.    FINDINGS:  LOWER CHEST: Within normal limits.    LIVER: Within normal limits.  BILE DUCTS: Intrahepatic biliary ductal dilatation with upper limits of   normal caliber extrahepatic bile duct measuring 8 mm in diameter. No   obstructing lesion or filling defect identified.  GALLBLADDER: There is a 3.3 cm stone likely impacted within the   gallbladder neckand mild gallbladder wall thickening.  SPLEEN: Within normal limits.  PANCREAS: Within normal limits.  ADRENALS: Within normal limits.  KIDNEYS/URETERS: There are several simple bilateral renal cysts measuring   up to 2.4 cm within the right kidney upper pole (2-17).    VISUALIZED PORTIONS:  BOWEL: Within normal limits.  PERITONEUM: No ascites.  VESSELS: Within normal limits.  RETROPERITONEUM/LYMPH NODES: No lymphadenopathy.  ABDOMINAL WALL: Within normal limits.  BONES: Within normal limits.    IMPRESSION:  1. There is a 3.3 cm stone likely impacted within the gallbladder neck   and mild bladder wall thickening, suspicious for cholecystitis.  2. Intrahepatic biliary ductal dilatation with upper limits of normal   extra hepatic biliary duct caliber without evidence of   choledocholithiasis or obstructing lesion. This is of uncertain etiology   and significance.      < end of copied text >    
SURGERY PA NOTE ON BEHALF OF DR. Venegas/ Gen SURGERY:    S: Patient seen and examined at bedside.   Pt without c/o abd pain, N/V, fever or chills. NPO for OR today    MEDICATIONS:  acetaminophen     Tablet .. 650 milliGRAM(s) Oral every 6 hours PRN  ALPRAZolam 0.25 milliGRAM(s) Oral at bedtime PRN  aluminum hydroxide/magnesium hydroxide/simethicone Suspension 30 milliLiter(s) Oral every 4 hours PRN  ceFAZolin   IVPB 2000 milliGRAM(s) IV Intermittent once  fluticasone propionate/ salmeterol 250-50 MICROgram(s) Diskus 1 Dose(s) Inhalation two times a day  lactated ringers. 1000 milliLiter(s) IV Continuous <Continuous>  latanoprost 0.005% Ophthalmic Solution 1 Drop(s) Left EYE at bedtime  levothyroxine 75 MICROGram(s) Oral daily  melatonin 3 milliGRAM(s) Oral at bedtime PRN  morphine  - Injectable 2 milliGRAM(s) IV Push every 4 hours PRN  ondansetron Injectable 4 milliGRAM(s) IV Push every 8 hours PRN  timolol 0.5% Solution 1 Drop(s) Left EYE daily  verapamil  milliGRAM(s) Oral daily      O:  Vital Signs Last 24 Hrs  T(C): 36.7 (17 Mar 2025 04:55), Max: 36.7 (17 Mar 2025 04:55)  T(F): 98.1 (17 Mar 2025 04:55), Max: 98.1 (17 Mar 2025 04:55)  HR: 84 (17 Mar 2025 04:55) (70 - 84)  BP: 124/68 (17 Mar 2025 04:55) (124/68 - 130/73)  BP(mean): --  RR: 18 (17 Mar 2025 04:55) (18 - 18)  SpO2: 93% (17 Mar 2025 04:55) (91% - 93%)    Parameters below as of 17 Mar 2025 04:55  Patient On (Oxygen Delivery Method): room air        I&O SUMMARY:    03-16-25 @ 07:01  -  03-17-25 @ 07:00  --------------------------------------------------------  IN: 525 mL / OUT: 0 mL / NET: 525 mL        PHYSICAL EXAM:  Lungs: CTA bilat    Card: S1S2  Abd: Soft, NT, ND.   No rebound/guarding.    Ext: Calves soft, NT, with tr edema bilat    LABS:                        13.4   6.66  )-----------( 226      ( 17 Mar 2025 06:36 )             39.6     03-17    142  |  107  |  5[L]  ----------------------------<  90  3.7   |  29  |  0.80    Ca    8.8      17 Mar 2025 06:36  Phos  3.4     03-17  Mg     1.9     03-16    TPro  6.9  /  Alb  3.1[L]  /  TBili  1.1  /  DBili  x   /  AST  130[H]  /  ALT  331[H]  /  AlkPhos  348[H]  03-16    PT/INR - ( 16 Mar 2025 08:10 )   PT: 10.5 sec;   INR: 0.89 ratio         PTT - ( 16 Mar 2025 08:10 )  PTT:34.0 sec          RADIOLOGY:    < from: CT Abdomen and Pelvis w/ IV Cont (03.13.25 @ 16:17) >  ACC: [id number] EXAM:  CT ABDOMEN AND PELVIS IC   ORDERED BY: MARIA VICTORIA BARAJAS     PROCEDURE DATE:  03/13/2025          INTERPRETATION:  CLINICAL INFORMATION: 79 years  Female with abd pain.    COMPARISON: None.    CONTRAST/COMPLICATIONS:  IV Contrast: Omnipaque 350  90 cc administered   10 cc discarded  Oral Contrast: NONE  .    PROCEDURE:  CT of the Abdomen and Pelvis was performed.  Sagittal and coronal reformats were performed.    FINDINGS:  LOWER CHEST: Mild bibasilar dependent atelectasis.    LIVER: Within normal limits.  BILE DUCTS: Mild intrahepatic biliary duct distention. Common duct 8 mm   the. 2 to 3 mm distal common duct stone at the level of the ampulla   (301:45).  GALLBLADDER: 2.2 cm calcified gallstone in the gallbladder neck.   Pericholecystic fat stranding and fluid concerning for acute   cholecystitis.  SPLEEN: Within normal limits.  PANCREAS: Within normal limits.  ADRENALS: Within normal limits.  KIDNEYS/URETERS: Bilateral cysts and hypodensities too small to   characterize. No hydroureteronephrosis.    BLADDER: Within normal limits.  REPRODUCTIVE ORGANS: Hysterectomy.    BOWEL: No bowel obstruction. Appendix is not visualized. No evidence of   inflammation in the pericecal region.. Moderate colonic stool burden.  PERITONEUM/RETROPERITONEUM: Within normal limits.  VESSELS: Atherosclerotic changes.  LYMPH NODES: No lymphadenopathy.  ABDOMINAL WALL: Small fat-containing umbilical hernia.  BONES: Degenerative changes. Grade 1 L4-5 anterolisthesis.    IMPRESSION:  2 to 3 mm distal common duct stone with mild intra and extrahepatic   biliary distention.    Large gallstone. Pericholecystic fluid concerning for acute cholecystitis.    Findings were discussed with KRISTY Gruber 3/13/2025 4:30 PM by Dr. Arminda Holm with read back confirmation.      < end of copied text >  < from: US Abdomen Upper Quadrant Right (03.14.25 @ 09:50) >    ACC: [id number] EXAM:  US ABDOMEN RT UPR QUADRANT   ORDERED BY: GAB COTTRELL     PROCEDURE DATE:  03/14/2025          INTERPRETATION:  CLINICAL INFORMATION: Abdominal pain. Elevated LFTs.   Evaluate biliary tree.    COMPARISON: MRCP 3/13/2025.    TECHNIQUE: Sonography of the right upper quadrant.    FINDINGS:  Liver: Partially obscured by overlying shadowing.  Bile ducts: Common bile duct measures 8 mm.  Gallbladder: Large nonmobile gallstone in the neck. Wall thickening   measuring 6 mm. Negative sonographic White sign.  Pancreas: Visualized portions are within normal limits.  Right kidney: 10.0 cm. No hydronephrosis. Cortical thinning with   increased echogenicity. Mid pole cyst measuring 2.3 cm.  Ascites: None.  IVC: Visualized portions are within normal limits.    IMPRESSION:  Mild prominence of the common bile duct.    Large nonmobile gallstone in the neck with gallbladder wall thickening.    < end of copied text >    
[facility]    Chaim Tyson NP    [address] [place] [address] [phone]      Chief Complaint:  Patient is a 79y old  Female who presents with a chief complaint of Acute Cholecystitis (16 Mar 2025 09:56)      HPI/ 24 hr events:   Patient seen and examined at bedside  Reports abdominal pain has resolved  Has been tolerating full liquid diet  No fever, chills, nausea, vomiting or abdominal pain  Pending OR for cholecystectomy today       REVIEW OF SYSTEMS:   General: Negative  HEENT: Negative  CV: Negative  Respiratory: Negative  GI: See HPI  : Negative  MSK: Negative  Hematologic: Negative  Skin: Negative    MEDICATIONS:   MEDICATIONS  (STANDING):    MEDICATIONS  (PRN):      ALLERGIES:   Allergies    No Known Drug Allergies  Seafood (Other)    Intolerances    adhesives (Other)      VITAL SIGNS:   Vital Signs Last 24 Hrs  T(C): 36.8 (17 Mar 2025 09:50), Max: 36.8 (16 Mar 2025 22:39)  T(F): 98.2 (17 Mar 2025 09:50), Max: 98.2 (16 Mar 2025 22:39)  HR: 74 (17 Mar 2025 09:50) (74 - 84)  BP: 142/80 (17 Mar 2025 09:50) (124/68 - 142/80)  BP(mean): --  RR: 16 (17 Mar 2025 09:50) (16 - 18)  SpO2: 95% (17 Mar 2025 09:50) (91% - 95%)    Parameters below as of 17 Mar 2025 04:55  Patient On (Oxygen Delivery Method): room air      I&O's Summary    16 Mar 2025 07:01  -  17 Mar 2025 07:00  --------------------------------------------------------  IN: 525 mL / OUT: 0 mL / NET: 525 mL        PHYSICAL EXAM:   GENERAL:  No acute distress  HEENT:  NC/AT  CHEST:  No increased effort  HEART:  Regular rate  ABDOMEN:  Soft, non-tender, non-distended  EXTREMITIES: No cyanosis  SKIN:  Warm, dry  NEURO:  Calm, cooperative    LABS:                        13.4   6.66  )-----------( 226      ( 17 Mar 2025 06:36 )             39.6     03-17    142  |  107  |  5[L]  ----------------------------<  90  3.7   |  29  |  0.80    Ca    8.8      17 Mar 2025 06:36  Phos  3.4     03-17  Mg     1.9     03-16    TPro  6.9  /  Alb  3.1[L]  /  TBili  1.1  /  DBili  x   /  AST  130[H]  /  ALT  331[H]  /  AlkPhos  348[H]  03-16    LIVER FUNCTIONS - ( 16 Mar 2025 08:10 )  Alb: 3.1 g/dL / Pro: 6.9 g/dL / ALK PHOS: 348 U/L / ALT: 331 U/L / AST: 130 U/L / GGT: x           PT/INR - ( 16 Mar 2025 08:10 )   PT: 10.5 sec;   INR: 0.89 ratio         PTT - ( 16 Mar 2025 08:10 )  PTT:34.0 sec                                  RADIOLOGY & ADDITIONAL STUDIES:  
98.2

## 2025-03-17 NOTE — DISCHARGE NOTE PROVIDER - NS AS DC PROVIDER CONTACT Y/N MULTI
Attendance at Delivery    Reason for attendance , scheduled   Oxygen Needed , no  Positive Pressure Needed , no  Baby Vigorous , yes  Evidence of Meconium , no      Patient delivered and began crying.  Color pinking quickly, B/S clearing nicely.  Apgar 8&9, RN & RT in agreement.  No respiratory distress noted.  Left patient in RN care.                      
Yes

## 2025-03-17 NOTE — CASE MANAGEMENT PROGRESS NOTE - NSCMPROGRESSNOTE_GEN_ALL_CORE
Per MD pt is for possible discharge today 3/17/25 pending diet tolerance. CM met with patient / son / spouse to discuss transition of care. Pt gave verbal consent to speak with family at bedside. Pt is to be transitioned to home with no formal home care services, pt offered home care services, pt declined home care services. CM explained home care services, expectations, process, insurance provisions and home safety with pt verbalizing understanding.  Pt son states he will assist post discharge as necessary. Pt aware of plan and in agreement / verbalizes understanding. IMM discussed / given. Pt verbalized understanding. Confirmed pharmacy is [facility]. community MD is Dr. Hadley. Pt stated they would make their own follow up appointments. Pt denies DME and need for usage. Pt son stated he will transport pt home. All questions answered. CM discussed plan with MD and RN. CM to remain available through hospitalization.

## 2025-03-17 NOTE — PROGRESS NOTE ADULT - PROVIDER SPECIALTY LIST ADULT
Gastroenterology
Hospitalist
Surgery
Hospitalist
Surgery
Gastroenterology
Hospitalist
Infectious Disease
Gastroenterology

## 2025-03-17 NOTE — DISCHARGE NOTE PROVIDER - CARE PROVIDER_API CALL
Ian Hadley  Internal Medicine  [address]  [place] [address]  Phone: [phone]  Fax: [phone]  Follow Up Time:     Mina Venegas Ryan  Surgery  [address], [address]  [place] [address]  Phone: [phone]  Fax: [phone]  Follow Up Time:

## 2025-03-17 NOTE — BRIEF OPERATIVE NOTE - NSICDXBRIEFPROCEDURE_GEN_ALL_CORE_FT
PROCEDURES:  Robot-assisted cholecystectomy with cholangiography 17-Mar-2025 12:07:05  Mina Venegas

## 2025-03-17 NOTE — DISCHARGE NOTE NURSING/CASE MANAGEMENT/SOCIAL WORK - FINANCIAL ASSISTANCE
[facility] provides services at a reduced cost to those who are determined to be eligible through [facility]’s financial assistance program. Information regarding [facility]’s financial assistance program can be found by going to [email or web address] or by calling [phone].

## 2025-03-17 NOTE — PROGRESS NOTE ADULT - ASSESSMENT
78 y/o F admitted with acute cholecystitis     No leukocytosis appreciated.   LFTs elevated but down trending   AVSS     - Plan for robotic assisted laparoscopy cholecystectomy today 03/16/25 with Dr. Venegas   - NPO after midnight   - Appreciate medical optimiazation
79F active smoker, COPD, HTN, HLD, glaucoma, hypothyroid p/w acute cholecystitis   symptoms intermittent  lfts elevated  biliary stone appears to have passed  GI and surgery following  pain control  antiemetics  liquid diet over weekend  plan for lap melo monday  medically acceptable for procedure    smoking  discussed cessation  pt not interested    COPD  continue inhalers    HTN/HLD  continue verapamil  hold statin for now    hypothyroid   continue synthroid
79F active smoker, COPD, HTN, HLD, glaucoma, hypothyroid p/w acute cholecystitis with possible CBD obstruction  symptoms intermittent  lfts elevated  biliary stone appears to have passed  GI and surgery following  pain control  antiemetics  liquid diet over weekend  plan for tentative lap melo monday if bilis improved    smoking  discussed cessation  pt not interested    COPD  continue inhalers    HTN/HLD  continue verapamil  hold statin for now    hypothyroid   continue synthroid
78 yo female with history of HTN, HLD, COPD, hypothyroidism, and glaucoma who is presenting with abdominal pain and nausea. Admitted to r/o acute cholecystitis. Found with a 2 to 3mm distal CBD stone on CT imaging.     MRCP (3/13): Intrahepatic biliary ductal dilatation with upper limits of normal extra hepatic biliary duct caliber without evidence of choledocholithiasis or obstructing lesion.    RUQ US (3/14): Large nonmobile gallstone in the neck with gallbladder wall thickening. Mild prominence of the common bile duct.    Plan:  - CT A/P, MRCP, and RUQ US noted & discussed with patient   - Given no choledocholithiasis and total bilirubin/liver enzymes downtrending, likely passed CBD stone. No role for ERCP.   - Surgery team following, likely chronic cholecystitis and requires lower bilirubin prior to cholecystectomy   - Plan for OR today (3/17) for cholecystectomy    - NPO for planned procedure   - ID following, observe off antibiotics for now   - Total bilirubin and liver enzymes downtrending continue to trend CMP daily   - Pain management  - Anti-emetics PRN  - Monitor GI function   - To follow   
79F active smoker, COPD, HTN, HLD, glaucoma, hypothyroid p/w acute cholecystitis with possible CBD obstruction  symptoms intermittent  lfts elevated  biliary stone appears to have passed  GI and surgery following  pain control  antiemetics  liquid diet over weekend  plan for lap melo monday    smoking  discussed cessation  pt not interested    COPD  continue inhalers    HTN/HLD  continue verapamil  hold statin for now    hypothyroid   continue synthroid
79yFemale with PMHx hypertension, hyperlipidemia, COPD, R glaucoma, hypothyroidism presents to the ED after abdominal pain x1week and abnormal outpatient RUQ ultrasound results along with elevated LFTs and bilirubin. Patient sent to ED on behalf of GI doctor to be evaluated by Dr. Venegas. Abd CT significant for 2 to 3 mm distal common duct stone with mild intra and extrahepatic biliary distention. Large gallstone. Pericholecystic fluid concerning for acute cholecystitis.  Tbili and LFT's downtrending  Asymtomatic  Maintain NPO/IVF  Continue abx  GI consult  Will obtain RUQ US  Trend Tbili and LFT's  TATE  Pain regimen/supportive care  VTE ppx  Discussed with Dr Venegas
80 yo female with history of HTN, HLD, COPD, hypothyroidism, and glaucoma who is presenting with abdominal pain and nausea. Admitted to r/o acute cholecystitis. Found with a 2 to 3mm distal CBD stone on CT imaging.     MRCP (3/13): Intrahepatic biliary ductal dilatation with upper limits of normal extra hepatic biliary duct caliber without evidence of choledocholithiasis or obstructing lesion.    RUQ US (3/14): Large nonmobile gallstone in the neck with gallbladder wall thickening. Mild prominence of the common bile duct.    Plan:  - CT A/P, MRCP, and RUQ US noted & discussed with patient   - Given no choledocholithiasis and total bilirubin/liver enzymes downtrending, likely passed CBD stone. No role for ERCP.   - Surgery team following, likely chronic cholecystitis and requires lower bilirubin prior to cholecystectomy   - Plan for OR 3/17   - ID following, observe off antibiotics for now   - Trend CMP daily   - No objection from GI standpoint to advance to clear liquids  - Pain management  - Anti-emetics PRN  - Monitor GI function   - To follow 
Pt is a 79W w/ PMHx of COPD, HTN, HLD, glaucoma, hypothyroid p/w abd pain. Pt reports episodes of squeezing like pain for several days. Associated with nausea but no vomiting. pt points to her epigastric area when describing the pain but also reports feeling like a band is circling and squeezing her abdomen. outpatient US with GB wall thickening to 9mm, sent in by GI.     ID c/s for further evaluation, r/o acute cholecystitis    3/13 CT There is a 3.3 cm stone likely impacted within the gallbladder neck and mild bladder wall thickening, suspicious for cholecystitis.  3/13 MRCP 2 to 3 mm distal common duct stone with mild intra and extrahepatic biliary distention.  Large gallstone. Pericholecystic fluid concerning for acute cholecystitis.  3/14 RUQ Sono Mild prominence of the common bile duct. Large nonmobile gallstone in the neck with gallbladder wall thickening.  3/17 OR for cholecystectomy    Pt reporting yeast infection under breast today.    Afebrile no leukocytosis    Recommendations:  Routine pre-op/skinny-op Abx per protocol  Clotrimazole topical BID to under breast area  Trend temps/WBC  Trend LFTs  Serial abdominal exams  GI following  Surgery following  Additional care per primary team    Patient evaluated with face-to-face time in addition to reviewing history, labs, microbiology, and imaging.   Antibiotic stewardship, local antibiogram, infection control strategies and potential transmission issues taken into consideration at time of treatment decision making process.   Thank you for allowing us to participate in the care of your patient.  Infectious Diseases will follow. Please call with any questions.  Kathy Ortiz M.D.  Available on [employer] TEAMS -- *[facility]*  [facility] [phone]  For after 5 P.M. and weekends, please call [phone]      
78 yo female with history of HTN, HLD, COPD, hypothyroidism, and glaucoma who is presenting with abdominal pain and nausea. Admitted to r/o acute cholecystitis. Found with a 2 to 3mm distal CBD stone on CT imaging.     MRCP (3/13): Intrahepatic biliary ductal dilatation with upper limits of normal extra hepatic biliary duct caliber without evidence of choledocholithiasis or obstructing lesion.    RUQ US (3/14): Large nonmobile gallstone in the neck with gallbladder wall thickening. Mild prominence of the common bile duct.    Plan:  - CT A/P, MRCP, and RUQ US noted & discussed with patient   - Given no choledocholithiasis and total bilirubin/liver enzymes downtrending, likely passed CBD stone. No role for ERCP.   - Surgery team following, likely chronic cholecystitis and requires lower bilirubin prior to cholecystectomy   - Plan for OR 3/17   - ID following, observe off antibiotics for now   - Trend CMP daily   - No objection from GI standpoint to advance to clear liquids  - Pain management  - Anti-emetics PRN  - Monitor GI function   - To follow 
Surg. Att  Pt. seen and examinedon rounds with surg. team. She is quite comfortable. She understands the plan of RX and is awaiting surgery on Monday.  I agree with above note
As in above full notation, unless countered below.  Ms. Blanco personally seen/ examined during daily rounds.  Denies pain at time of my visitation and reports toleration of liquid diet.  Vitals remain non-suggestive.  Abdomen soft with +/- RUQ tenderness.  Labs reassuring with normal WBCs and decreasing LFTs.  Clinically, improving overall and surgically stable for present.  To continue current supportive care with cholecystectomy scheduled for 3/17.

## 2025-03-17 NOTE — CASE MANAGEMENT PROGRESS NOTE - NSCMPROGRESSNOTE_GEN_ALL_CORE
Discussed pt on rounds, pt remains acute, pt for lap melo today. CM will continue to collaborate with interdisciplinary team and remain available to assist.

## 2025-03-17 NOTE — DISCHARGE NOTE PROVIDER - NSDCMRMEDTOKEN_GEN_ALL_CORE_FT
Advair Diskus 250 mcg-50 mcg/inh inhalation powder: 1 inhaled 2 times a day  Aspirin Low Dose 81 mg oral delayed release tablet: 1 tab(s) orally once a day in morning - last dose 3/1/2018  calcium: 600 milligram(s) orally 2 times a day  clotrimazole 1% topical cream: Apply topically to affected area 2 times a day  ibuprofen 600 mg oral tablet: 1 tab(s) orally every 6 hours as needed for Moderate Pain (4 - 6)  latanoprost 0.005% ophthalmic solution: 1 drop(s) in each eye once a day (at bedtime)  Synthroid 75 mcg (0.075 mg) oral tablet: 1 tab(s) orally once a day  timolol hemihydrate 0.5% ophthalmic solution: 1 drop(s) to each affected eye once a day- in morning  verapamil 240 mg/24 hours oral capsule, extended release: 1 cap(s) orally once a day in morning  Vitamin B12 1000 mcg oral tablet: 1 tab(s) orally once a day in morning  vitamin D: 400 milligram(s) orally once a day in morning  Vytorin 10 mg-40 mg oral tablet: 1 tab(s) orally once a day in morning  Xanax 0.25 mg oral tablet: 1 tab(s) orally once a day (at bedtime) as needed for  insomnia

## 2025-03-17 NOTE — DISCHARGE NOTE NURSING/CASE MANAGEMENT/SOCIAL WORK - PATIENT PORTAL LINK FT
You can access the FollowMyHealth Patient Portal offered by [facility] by registering at the following website: [email or web address]. By joining [email or web address] FollowMyHealth portal, you will also be able to view your health information using other applications (apps) compatible with our system.

## 2025-03-17 NOTE — DISCHARGE NOTE NURSING/CASE MANAGEMENT/SOCIAL WORK - NSDCPEFALRISK_GEN_ALL_CORE
For information on Fall & Injury Prevention, visit: [email or web address] OR  [email or web address] OR  https://www.cdc.gov/steadi/patient.html

## 2025-03-17 NOTE — DISCHARGE NOTE PROVIDER - HOSPITAL COURSE
79F active smoker, COPD, HTN, HLD, glaucoma, hypothyroid p/w abd pain. Pt reports episodes of squeezing like pain for several days. Associated with nausea but no vomiting. pt points to her epigastric area when describing the pain but also reports feeling like a band is circling and squeezing her abdomen. outpatient US with GB wall thickening to 9mm, sent in by GI. Pt had elevated lfts in an obstructive pattern with a distal cbd stone seen on CT. She was also noted to have cholecystitis. follow up mrcp revealed the stone had passed. she was seen by surgery, GI, and ID. She underwent cholecystectomy and was dced home.   LABS:                        13.4   6.66  )-----------( 226      ( 17 Mar 2025 06:36 )             39.6     03-17    142  |  107  |  5[L]  ----------------------------<  90  3.7   |  29  |  0.80    Ca    8.8      17 Mar 2025 06:36  Phos  3.4     03-17  Mg     1.9     03-16    TPro  6.9  /  Alb  3.1[L]  /  TBili  1.1  /  DBili  x   /  AST  130[H]  /  ALT  331[H]  /  AlkPhos  348[H]  03-16        RADIOLOGY & ADDITIONAL TESTS:    < from: MR MRCP No Cont (03.13.25 @ 17:55) >    IMPRESSION:  1. There is a 3.3 cm stone likely impacted within the gallbladder neck   and mild bladder wall thickening, suspicious for cholecystitis.  2. Intrahepatic biliary ductal dilatation with upper limits of normal   extra hepatic biliary duct caliber without evidence of   choledocholithiasis or obstructing lesion. This is of uncertain etiology   and significance.    < end of copied text >    < from: CT Abdomen and Pelvis w/ IV Cont (03.13.25 @ 16:17) >    IMPRESSION:  2 to 3 mm distal common duct stone with mild intra and extrahepatic   biliary distention.    Large gallstone. Pericholecystic fluid concerning for acute cholecystitis.    < end of copied text >    < from: US Abdomen Upper Quadrant Right (03.14.25 @ 09:50) >    IMPRESSION:  Mild prominence of the common bile duct.    Large nonmobile gallstone in the neck with gallbladder wall thickening.    < end of copied text >

## (undated) DEVICE — WARMING BLANKET UPPER ADULT

## (undated) DEVICE — PACK GENERAL LAPAROSCOPY

## (undated) DEVICE — ENDOCATCH 10MM

## (undated) DEVICE — VENODYNE/SCD SLEEVE CALF MEDIUM

## (undated) DEVICE — XI CORD MONOPOLAR CAUTERY (GREEN)

## (undated) DEVICE — DRSG DERMABOND 0.7ML

## (undated) DEVICE — DRAPE 3/4 SHEET W REINFORCEMENT 56X77"

## (undated) DEVICE — TUBING STRYKER PNEUMOCLEAR HIGH FLOW

## (undated) DEVICE — STAPLER SKIN PROXIMATE

## (undated) DEVICE — XI CANNULA SEAL 5MM - 8 MM

## (undated) DEVICE — SOL IRR POUR NS 0.9% 1000ML

## (undated) DEVICE — GLV 7.5 PROTEXIS (WHITE)

## (undated) DEVICE — XI DRAPE COLUMN

## (undated) DEVICE — XI ENDOWRIST SUCTION IRRIGATOR 8MM

## (undated) DEVICE — VENODYNE/SCD SLEEVE CALF LARGE

## (undated) DEVICE — XI DRAPE ARM

## (undated) DEVICE — XI CORD BIPOLAR CAUTERY (BLUE)

## (undated) DEVICE — NDL HYPO REGULAR BEVEL 25G X 1.5" (BLUE)

## (undated) DEVICE — XI ENDOWRIST 12 - 8 MM CANNULA REDUCER

## (undated) DEVICE — XI 12MM AND STAPLER CANNULA SEAL

## (undated) DEVICE — SMOKE EVACUTATION SYS LAPROSCOPIC AC/PA

## (undated) DEVICE — SOL IRR POUR H2O 1000ML

## (undated) DEVICE — SUT MONOCRYL 4-0 27" PS-2 UNDYED

## (undated) DEVICE — SUT POLYSORB 0 30" GU-45

## (undated) DEVICE — BLADE SCALPEL SAFETYLOCK #15

## (undated) DEVICE — BLADE SCALPEL SAFETY #11 WITH PLASTIC GREEN HANDLE

## (undated) DEVICE — ELCTR BOVIE TIP BLADE INSULATED 2.75" EDGE

## (undated) DEVICE — PLV-SCD MACHINE: Type: DURABLE MEDICAL EQUIPMENT

## (undated) DEVICE — DRAPE TOWEL BLUE 17" X 24"

## (undated) DEVICE — XI OBTURATOR OPTICAL BLADELESS 8MM

## (undated) DEVICE — SOL IRR BAG NS 0.9% 1000ML

## (undated) DEVICE — D HELP - CLEARVIEW CLEARIFY SYSTEM